# Patient Record
Sex: FEMALE | Race: WHITE | Employment: PART TIME | URBAN - METROPOLITAN AREA
[De-identification: names, ages, dates, MRNs, and addresses within clinical notes are randomized per-mention and may not be internally consistent; named-entity substitution may affect disease eponyms.]

---

## 2017-03-31 DIAGNOSIS — L70.0 ACNE VULGARIS: ICD-10-CM

## 2017-04-06 RX ORDER — NORETHINDRONE ACETATE AND ETHINYL ESTRADIOL 1MG-20(21)
KIT ORAL
Qty: 3 PACKET | Refills: 1 | Status: SHIPPED | OUTPATIENT
Start: 2017-04-06 | End: 2017-04-30 | Stop reason: SDUPTHER

## 2017-04-30 RX ORDER — NORETHINDRONE ACETATE AND ETHINYL ESTRADIOL 1MG-20(21)
KIT ORAL
Qty: 3 PACKET | Refills: 1 | Status: SHIPPED | OUTPATIENT
Start: 2017-04-30 | End: 2017-11-28 | Stop reason: SDUPTHER

## 2017-05-08 ENCOUNTER — OFFICE VISIT (OUTPATIENT)
Dept: FAMILY MEDICINE CLINIC | Age: 42
End: 2017-05-08

## 2017-05-08 VITALS
DIASTOLIC BLOOD PRESSURE: 74 MMHG | OXYGEN SATURATION: 97 % | SYSTOLIC BLOOD PRESSURE: 102 MMHG | HEIGHT: 60 IN | BODY MASS INDEX: 32.67 KG/M2 | HEART RATE: 82 BPM | WEIGHT: 166.4 LBS

## 2017-05-08 DIAGNOSIS — S86.811A STRAIN OF CALF MUSCLE, RIGHT, INITIAL ENCOUNTER: Primary | ICD-10-CM

## 2017-05-08 PROBLEM — S86.819A STRAIN OF CALF MUSCLE: Status: ACTIVE | Noted: 2017-05-08

## 2017-05-08 PROCEDURE — 99213 OFFICE O/P EST LOW 20 MIN: CPT | Performed by: FAMILY MEDICINE

## 2017-05-08 RX ORDER — PREDNISONE 10 MG/1
10 TABLET ORAL 2 TIMES DAILY
Qty: 10 TABLET | Refills: 0 | Status: SHIPPED | OUTPATIENT
Start: 2017-05-08 | End: 2017-05-13

## 2017-05-08 RX ORDER — IBUPROFEN 200 MG
200 TABLET ORAL EVERY 6 HOURS PRN
COMMUNITY

## 2017-05-08 ASSESSMENT — ENCOUNTER SYMPTOMS
SORE THROAT: 0
EYE DISCHARGE: 0
DIARRHEA: 0
COUGH: 0
VOMITING: 0
BLOOD IN STOOL: 0
NAUSEA: 0
BACK PAIN: 0
SINUS PRESSURE: 0
SHORTNESS OF BREATH: 0

## 2017-05-10 ENCOUNTER — TELEPHONE (OUTPATIENT)
Dept: FAMILY MEDICINE CLINIC | Age: 42
End: 2017-05-10

## 2017-05-30 ENCOUNTER — OFFICE VISIT (OUTPATIENT)
Dept: FAMILY MEDICINE CLINIC | Age: 42
End: 2017-05-30

## 2017-05-30 VITALS
OXYGEN SATURATION: 98 % | BODY MASS INDEX: 32.34 KG/M2 | WEIGHT: 167 LBS | SYSTOLIC BLOOD PRESSURE: 126 MMHG | HEART RATE: 80 BPM | DIASTOLIC BLOOD PRESSURE: 80 MMHG

## 2017-05-30 DIAGNOSIS — F33.0 MAJOR DEPRESSIVE DISORDER, RECURRENT EPISODE, MILD (HCC): ICD-10-CM

## 2017-05-30 DIAGNOSIS — L70.0 ACNE VULGARIS: Primary | ICD-10-CM

## 2017-05-30 PROBLEM — R62.50 DEVELOPMENTAL DELAY: Status: ACTIVE | Noted: 2017-05-30

## 2017-05-30 PROCEDURE — 99213 OFFICE O/P EST LOW 20 MIN: CPT | Performed by: FAMILY MEDICINE

## 2017-05-30 ASSESSMENT — PATIENT HEALTH QUESTIONNAIRE - PHQ9
1. LITTLE INTEREST OR PLEASURE IN DOING THINGS: 0
SUM OF ALL RESPONSES TO PHQ QUESTIONS 1-9: 0
SUM OF ALL RESPONSES TO PHQ9 QUESTIONS 1 & 2: 0
2. FEELING DOWN, DEPRESSED OR HOPELESS: 0

## 2017-08-28 DIAGNOSIS — F33.0 MAJOR DEPRESSIVE DISORDER, RECURRENT EPISODE, MILD (HCC): ICD-10-CM

## 2017-08-28 RX ORDER — FLUOXETINE HYDROCHLORIDE 20 MG/1
20 CAPSULE ORAL DAILY
Qty: 90 CAPSULE | Refills: 1 | Status: SHIPPED | OUTPATIENT
Start: 2017-08-28 | End: 2017-11-28 | Stop reason: SDUPTHER

## 2017-11-28 ENCOUNTER — OFFICE VISIT (OUTPATIENT)
Dept: FAMILY MEDICINE CLINIC | Age: 42
End: 2017-11-28

## 2017-11-28 VITALS
SYSTOLIC BLOOD PRESSURE: 124 MMHG | WEIGHT: 166 LBS | HEART RATE: 67 BPM | OXYGEN SATURATION: 98 % | HEIGHT: 60 IN | DIASTOLIC BLOOD PRESSURE: 76 MMHG | BODY MASS INDEX: 32.59 KG/M2

## 2017-11-28 DIAGNOSIS — E78.2 MIXED HYPERLIPIDEMIA: ICD-10-CM

## 2017-11-28 DIAGNOSIS — L70.0 ACNE VULGARIS: ICD-10-CM

## 2017-11-28 DIAGNOSIS — J30.2 PERENNIAL ALLERGIC RHINITIS WITH SEASONAL VARIATION: ICD-10-CM

## 2017-11-28 DIAGNOSIS — J30.89 PERENNIAL ALLERGIC RHINITIS WITH SEASONAL VARIATION: ICD-10-CM

## 2017-11-28 DIAGNOSIS — Z23 NEED FOR INFLUENZA VACCINATION: ICD-10-CM

## 2017-11-28 DIAGNOSIS — F33.0 MAJOR DEPRESSIVE DISORDER, RECURRENT EPISODE, MILD (HCC): Primary | ICD-10-CM

## 2017-11-28 PROCEDURE — G8427 DOCREV CUR MEDS BY ELIG CLIN: HCPCS | Performed by: FAMILY MEDICINE

## 2017-11-28 PROCEDURE — 1036F TOBACCO NON-USER: CPT | Performed by: FAMILY MEDICINE

## 2017-11-28 PROCEDURE — G8484 FLU IMMUNIZE NO ADMIN: HCPCS | Performed by: FAMILY MEDICINE

## 2017-11-28 PROCEDURE — 99214 OFFICE O/P EST MOD 30 MIN: CPT | Performed by: FAMILY MEDICINE

## 2017-11-28 PROCEDURE — 90686 IIV4 VACC NO PRSV 0.5 ML IM: CPT | Performed by: FAMILY MEDICINE

## 2017-11-28 PROCEDURE — G8417 CALC BMI ABV UP PARAM F/U: HCPCS | Performed by: FAMILY MEDICINE

## 2017-11-28 PROCEDURE — G0008 ADMIN INFLUENZA VIRUS VAC: HCPCS | Performed by: FAMILY MEDICINE

## 2017-11-28 RX ORDER — NORETHINDRONE ACETATE AND ETHINYL ESTRADIOL 1MG-20(21)
KIT ORAL
Qty: 3 PACKET | Refills: 3 | Status: SHIPPED | OUTPATIENT
Start: 2017-11-28 | End: 2020-02-11 | Stop reason: SDUPTHER

## 2017-11-28 RX ORDER — CETIRIZINE HYDROCHLORIDE 10 MG/1
10 TABLET ORAL NIGHTLY
Qty: 90 TABLET | Refills: 3 | Status: SHIPPED | OUTPATIENT
Start: 2017-11-28

## 2017-11-28 RX ORDER — FLUOXETINE HYDROCHLORIDE 40 MG/1
40 CAPSULE ORAL DAILY
Qty: 90 CAPSULE | Refills: 3 | Status: SHIPPED | OUTPATIENT
Start: 2017-11-28 | End: 2017-11-30 | Stop reason: SDUPTHER

## 2017-11-28 RX ORDER — DOXYCYCLINE HYCLATE 100 MG
TABLET ORAL
Qty: 90 TABLET | Refills: 3 | Status: CANCELLED | OUTPATIENT
Start: 2017-11-28

## 2017-11-28 RX ORDER — FLUTICASONE PROPIONATE 50 MCG
2 SPRAY, SUSPENSION (ML) NASAL DAILY PRN
Qty: 1 BOTTLE | Refills: 3 | Status: CANCELLED | OUTPATIENT
Start: 2017-11-28

## 2017-11-28 NOTE — PROGRESS NOTES
Chief Complaint   Patient presents with    6 Month Follow-Up    Discuss Medications     prozac     Moods have become more irritable per patient since decrease prozac 20mg. Acne medication stable on OCP and doxycycline without side effects. Patient denies any exertional chest pain, dyspnea, palpitations, syncope, orthopnea, edema or paroxysmal nocturnal dyspnea. Denies any fever, chills, unintentional weight loss or night sweats. Denies any suicidial or homicidal ideation or hallucinations. No change in vision. ROS: Pertinent items are noted in HPI. reports that she has never smoked. She has never used smokeless tobacco.   reports that she does not drink alcohol. Physical Exam     Nursing note reviewed  /76 (Site: Right Arm, Position: Sitting, Cuff Size: Medium Adult)   Pulse 67   Ht 5' 0.25\" (1.53 m)   Wt 166 lb (75.3 kg)   LMP  (LMP Unknown)   SpO2 98%   BMI 32.15 kg/m²   BP Readings from Last 3 Encounters:   11/28/17 124/76   05/30/17 126/80   05/08/17 102/74     Wt Readings from Last 3 Encounters:   11/28/17 166 lb (75.3 kg)   05/30/17 167 lb (75.8 kg)   05/08/17 166 lb 6.4 oz (75.5 kg)     Body mass index is 32.15 kg/m². Constitutional: She is oriented to person, place, and time and here  Improved moods and asking lots of questions during interview. Very interactive. Few scattered papules on chin. Here with Audrey Walker her caregiver  HENT: Mouth/Throat: Oropharynx is clear and moist.    Eyes: Conjunctivae are normal. Pupils are equal, round, and reactive to light. Neck: Neck supple. Cardiovascular: Normal rate, regular rhythm and normal heart sounds. no murmur   Pulmonary/Chest: Effort normal and breath sounds normal.  no wheezing, ronchi,   Neurological: She is alert and oriented to person, place, and time. Walks stammering gait and using seated wheeled walked today. Shelvy Setting Resting head tremor.    MSK: no LE edema   Psych: appears anxious when discussing some social issues in her

## 2017-11-28 NOTE — PROGRESS NOTES
Vaccine Information Sheet, \"Influenza - Inactivated\"  given to Fabricio Briones, or parent/legal guardian of  Fabricio Briones and verbalized understanding. Patient responses:    Have you ever had a reaction to a flu vaccine? No  Are you able to eat eggs without adverse effects? Yes  Do you have any current illness? No  Have you ever had Guillian Houston Syndrome? No    Flu vaccine given per order. Please see immunization tab.

## 2017-11-28 NOTE — ASSESSMENT & PLAN NOTE
Patient moods have gotten more irritable and less motivation since decrease in medication. Will trial increase to prozac 40mg to take nightly.

## 2018-06-12 DIAGNOSIS — F33.0 MAJOR DEPRESSIVE DISORDER, RECURRENT EPISODE, MILD (HCC): ICD-10-CM

## 2018-06-12 RX ORDER — FLUOXETINE HYDROCHLORIDE 20 MG/1
20 CAPSULE ORAL DAILY
Qty: 90 CAPSULE | Refills: 1 | Status: SHIPPED | OUTPATIENT
Start: 2018-06-12 | End: 2018-11-13

## 2018-06-25 DIAGNOSIS — L70.0 ACNE VULGARIS: ICD-10-CM

## 2018-06-25 RX ORDER — NORETHINDRONE ACETATE AND ETHINYL ESTRADIOL 1MG-20(21)
KIT ORAL
Qty: 90 TABLET | Refills: 1 | Status: SHIPPED | OUTPATIENT
Start: 2018-06-25 | End: 2018-12-13 | Stop reason: SDUPTHER

## 2018-11-13 DIAGNOSIS — L70.0 ACNE VULGARIS: ICD-10-CM

## 2018-11-13 DIAGNOSIS — F33.0 MAJOR DEPRESSIVE DISORDER, RECURRENT EPISODE, MILD (HCC): ICD-10-CM

## 2018-11-13 RX ORDER — DOXYCYCLINE HYCLATE 100 MG
TABLET ORAL
Qty: 90 TABLET | Refills: 3 | Status: SHIPPED | OUTPATIENT
Start: 2018-11-13 | End: 2019-01-29 | Stop reason: SDUPTHER

## 2018-11-13 RX ORDER — FLUOXETINE HYDROCHLORIDE 40 MG/1
CAPSULE ORAL
Qty: 90 CAPSULE | Refills: 3 | Status: SHIPPED | OUTPATIENT
Start: 2018-11-13 | End: 2019-01-29 | Stop reason: SDUPTHER

## 2018-12-13 DIAGNOSIS — L70.0 ACNE VULGARIS: ICD-10-CM

## 2018-12-13 RX ORDER — NORETHINDRONE ACETATE AND ETHINYL ESTRADIOL 1MG-20(21)
KIT ORAL
Qty: 90 TABLET | Refills: 3 | Status: SHIPPED | OUTPATIENT
Start: 2018-12-13 | End: 2019-01-29 | Stop reason: SDUPTHER

## 2019-01-29 ENCOUNTER — OFFICE VISIT (OUTPATIENT)
Dept: FAMILY MEDICINE CLINIC | Age: 44
End: 2019-01-29
Payer: MEDICARE

## 2019-01-29 VITALS
WEIGHT: 168.2 LBS | SYSTOLIC BLOOD PRESSURE: 128 MMHG | BODY MASS INDEX: 33.02 KG/M2 | OXYGEN SATURATION: 97 % | RESPIRATION RATE: 16 BRPM | HEART RATE: 86 BPM | DIASTOLIC BLOOD PRESSURE: 80 MMHG | HEIGHT: 60 IN

## 2019-01-29 DIAGNOSIS — R26.81 UNSTEADY GAIT: ICD-10-CM

## 2019-01-29 DIAGNOSIS — L70.0 ACNE VULGARIS: ICD-10-CM

## 2019-01-29 DIAGNOSIS — F33.0 MAJOR DEPRESSIVE DISORDER, RECURRENT EPISODE, MILD (HCC): Primary | ICD-10-CM

## 2019-01-29 DIAGNOSIS — R62.50 DEVELOPMENTAL DELAY: ICD-10-CM

## 2019-01-29 PROCEDURE — 99214 OFFICE O/P EST MOD 30 MIN: CPT | Performed by: FAMILY MEDICINE

## 2019-01-29 PROCEDURE — G8417 CALC BMI ABV UP PARAM F/U: HCPCS | Performed by: FAMILY MEDICINE

## 2019-01-29 PROCEDURE — G8427 DOCREV CUR MEDS BY ELIG CLIN: HCPCS | Performed by: FAMILY MEDICINE

## 2019-01-29 PROCEDURE — 1036F TOBACCO NON-USER: CPT | Performed by: FAMILY MEDICINE

## 2019-01-29 PROCEDURE — G8484 FLU IMMUNIZE NO ADMIN: HCPCS | Performed by: FAMILY MEDICINE

## 2019-01-29 RX ORDER — FLUOXETINE HYDROCHLORIDE 40 MG/1
CAPSULE ORAL
Qty: 90 CAPSULE | Refills: 3 | Status: SHIPPED | OUTPATIENT
Start: 2019-01-29 | End: 2019-12-14 | Stop reason: SDUPTHER

## 2019-01-29 RX ORDER — NORETHINDRONE ACETATE AND ETHINYL ESTRADIOL 1MG-20(21)
KIT ORAL
Qty: 90 TABLET | Refills: 3 | Status: SHIPPED | OUTPATIENT
Start: 2019-01-29 | End: 2020-01-13 | Stop reason: SDUPTHER

## 2019-01-29 RX ORDER — DOXYCYCLINE HYCLATE 100 MG
TABLET ORAL
Qty: 90 TABLET | Refills: 3 | Status: SHIPPED | OUTPATIENT
Start: 2019-01-29 | End: 2019-12-20

## 2019-01-30 PROBLEM — S86.819A STRAIN OF CALF MUSCLE: Status: RESOLVED | Noted: 2017-05-08 | Resolved: 2019-01-30

## 2019-02-27 ENCOUNTER — TELEPHONE (OUTPATIENT)
Dept: FAMILY MEDICINE CLINIC | Age: 44
End: 2019-02-27

## 2019-05-13 ENCOUNTER — TELEPHONE (OUTPATIENT)
Dept: FAMILY MEDICINE CLINIC | Age: 44
End: 2019-05-13

## 2019-05-13 NOTE — LETTER
May 14, 2019     Patient: Nicolasa Shoemaker   YOB: 1975   Date of Visit: 5/13/2019       To Whom It May Concern: It is my medical opinion that Violet Gomes requires a disability parking placard for the following reasons:  She has limited walking ability due to a neurologic condition. Duration of need: 5 years    If you have any questions or concerns, please don't hesitate to call. Sincerely,          Jennifer Aguilera M.D., M.P.H  [address]  [facility] [facility] [address]  [facility], [address] [facility]  Phone: [phone]  Fax: [phone]

## 2019-05-13 NOTE — TELEPHONE ENCOUNTER
Pt mother came to the office and is asking for 2 handicap placard letters to be written for pt, encounter also sent for pt sister.   Please advise

## 2019-07-02 ENCOUNTER — OFFICE VISIT (OUTPATIENT)
Dept: FAMILY MEDICINE CLINIC | Age: 44
End: 2019-07-02
Payer: MEDICARE

## 2019-07-02 VITALS
OXYGEN SATURATION: 98 % | WEIGHT: 165 LBS | BODY MASS INDEX: 32.39 KG/M2 | HEART RATE: 106 BPM | DIASTOLIC BLOOD PRESSURE: 78 MMHG | HEIGHT: 60 IN | SYSTOLIC BLOOD PRESSURE: 128 MMHG

## 2019-07-02 DIAGNOSIS — R45.86 EMOTIONAL LABILITY: ICD-10-CM

## 2019-07-02 DIAGNOSIS — F33.0 MAJOR DEPRESSIVE DISORDER, RECURRENT EPISODE, MILD (HCC): Primary | ICD-10-CM

## 2019-07-02 PROCEDURE — 1036F TOBACCO NON-USER: CPT | Performed by: FAMILY MEDICINE

## 2019-07-02 PROCEDURE — 99213 OFFICE O/P EST LOW 20 MIN: CPT | Performed by: FAMILY MEDICINE

## 2019-07-02 PROCEDURE — G8427 DOCREV CUR MEDS BY ELIG CLIN: HCPCS | Performed by: FAMILY MEDICINE

## 2019-07-02 PROCEDURE — G8417 CALC BMI ABV UP PARAM F/U: HCPCS | Performed by: FAMILY MEDICINE

## 2019-07-08 ENCOUNTER — TELEPHONE (OUTPATIENT)
Dept: FAMILY MEDICINE CLINIC | Age: 44
End: 2019-07-08

## 2019-07-08 DIAGNOSIS — F33.0 MAJOR DEPRESSIVE DISORDER, RECURRENT EPISODE, MILD (HCC): Primary | ICD-10-CM

## 2019-07-11 ENCOUNTER — TELEPHONE (OUTPATIENT)
Dept: FAMILY MEDICINE CLINIC | Age: 44
End: 2019-07-11

## 2019-07-12 NOTE — TELEPHONE ENCOUNTER
Staff to call patient regarding I put a external  referral for counseling so she can start with the counselor she set up with from [facility]. Patient should call the office, rather than my personal phone if any further questions. Staff to schedule in person extended office visit with me in 8 weeks to follow up with her moods. Enid Lemus

## 2019-12-14 DIAGNOSIS — F33.0 MAJOR DEPRESSIVE DISORDER, RECURRENT EPISODE, MILD (HCC): ICD-10-CM

## 2019-12-16 RX ORDER — FLUOXETINE HYDROCHLORIDE 40 MG/1
CAPSULE ORAL
Qty: 90 CAPSULE | Refills: 3 | Status: SHIPPED | OUTPATIENT
Start: 2019-12-16 | End: 2020-08-18 | Stop reason: SDUPTHER

## 2019-12-18 DIAGNOSIS — L70.0 ACNE VULGARIS: ICD-10-CM

## 2019-12-20 RX ORDER — DOXYCYCLINE HYCLATE 100 MG
TABLET ORAL
Qty: 90 TABLET | Refills: 3 | Status: SHIPPED | OUTPATIENT
Start: 2019-12-20 | End: 2020-08-18 | Stop reason: SDUPTHER

## 2020-01-13 RX ORDER — NORETHINDRONE ACETATE AND ETHINYL ESTRADIOL 1MG-20(21)
KIT ORAL
Qty: 90 TABLET | Refills: 3 | Status: SHIPPED | OUTPATIENT
Start: 2020-01-13 | End: 2021-01-19

## 2020-02-10 PROBLEM — Z99.89 USES WALKER: Status: ACTIVE | Noted: 2020-02-10

## 2020-02-10 NOTE — PROGRESS NOTES
Chief Complaint   Patient presents with    1 Year Follow Up    Medication Refill   Working at [employer] M-F 9-2 and enjoying it. Prozac has been working well. No other complaints. No results found for: LABA1C  Lab Results   Component Value Date    CREATININE 0.7 06/13/2012     Lab Results   Component Value Date    ALT 23 06/13/2012    AST 27 06/13/2012     Lab Results   Component Value Date    CHOL 204 (H) 06/13/2012    TRIG 113 06/13/2012    HDL 55 (L) 06/13/2012    LDLDIRECT 144 (H) 06/13/2012            ROS: Pertinent items are noted in HPI. reports that she has never smoked. She has never used smokeless tobacco.   reports no history of alcohol use. Physical Exam     Nursing note reviewed  /74 (Site: Right Upper Arm, Position: Sitting, Cuff Size: Medium Adult)   Pulse 67   Resp 14   Ht 5' 0.25\" (1.53 m)   Wt 162 lb 6.4 oz (73.7 kg)   SpO2 98%   BMI 31.45 kg/m²   BP Readings from Last 3 Encounters:   02/11/20 128/74   07/02/19 128/78   01/29/19 128/80     Wt Readings from Last 3 Encounters:   02/11/20 162 lb 6.4 oz (73.7 kg)   07/02/19 165 lb (74.8 kg)   01/29/19 168 lb 3.2 oz (76.3 kg)     Body mass index is 31.45 kg/m². Constitutional: She is oriented to person, place, and time and here  Improved moods and asking lots of questions during interview. Very interactive. HENT: Mouth/Throat: Oropharynx is clear and moist.    Eyes: Conjunctivae are normal. Pupils are equal, round, and reactive to light. wears glasses mild horizontal nystagmus  Neck: Neck supple. Cardiovascular: Normal rate, regular rhythm and normal heart sounds. no murmur   Pulmonary/Chest: Effort normal and breath sounds normal.  no wheezing, ronchi,   Neurological: She is alert and oriented to person, place, and time. Walks stammering gait and using seated wheeled walked today. Canda Nay Resting head tremor. Psychiatric:  In good spirits today.    MSK: no LE edema       Diagnosis Assessment and Associated Orders:

## 2020-02-11 ENCOUNTER — OFFICE VISIT (OUTPATIENT)
Dept: FAMILY MEDICINE CLINIC | Age: 45
End: 2020-02-11
Payer: MEDICARE

## 2020-02-11 VITALS
HEART RATE: 67 BPM | SYSTOLIC BLOOD PRESSURE: 128 MMHG | RESPIRATION RATE: 14 BRPM | OXYGEN SATURATION: 98 % | HEIGHT: 60 IN | BODY MASS INDEX: 31.88 KG/M2 | DIASTOLIC BLOOD PRESSURE: 74 MMHG | WEIGHT: 162.4 LBS

## 2020-02-11 PROCEDURE — G8427 DOCREV CUR MEDS BY ELIG CLIN: HCPCS | Performed by: FAMILY MEDICINE

## 2020-02-11 PROCEDURE — 99213 OFFICE O/P EST LOW 20 MIN: CPT | Performed by: FAMILY MEDICINE

## 2020-02-11 PROCEDURE — G8484 FLU IMMUNIZE NO ADMIN: HCPCS | Performed by: FAMILY MEDICINE

## 2020-02-11 PROCEDURE — G8417 CALC BMI ABV UP PARAM F/U: HCPCS | Performed by: FAMILY MEDICINE

## 2020-02-11 PROCEDURE — G0444 DEPRESSION SCREEN ANNUAL: HCPCS | Performed by: FAMILY MEDICINE

## 2020-02-11 PROCEDURE — 1036F TOBACCO NON-USER: CPT | Performed by: FAMILY MEDICINE

## 2020-02-11 ASSESSMENT — PATIENT HEALTH QUESTIONNAIRE - PHQ9
SUM OF ALL RESPONSES TO PHQ QUESTIONS 1-9: 4
10. IF YOU CHECKED OFF ANY PROBLEMS, HOW DIFFICULT HAVE THESE PROBLEMS MADE IT FOR YOU TO DO YOUR WORK, TAKE CARE OF THINGS AT HOME, OR GET ALONG WITH OTHER PEOPLE: 1
9. THOUGHTS THAT YOU WOULD BE BETTER OFF DEAD, OR OF HURTING YOURSELF: 0
5. POOR APPETITE OR OVEREATING: 0
SUM OF ALL RESPONSES TO PHQ QUESTIONS 1-9: 4
3. TROUBLE FALLING OR STAYING ASLEEP: 0
1. LITTLE INTEREST OR PLEASURE IN DOING THINGS: 1
2. FEELING DOWN, DEPRESSED OR HOPELESS: 2
8. MOVING OR SPEAKING SO SLOWLY THAT OTHER PEOPLE COULD HAVE NOTICED. OR THE OPPOSITE, BEING SO FIGETY OR RESTLESS THAT YOU HAVE BEEN MOVING AROUND A LOT MORE THAN USUAL: 0
4. FEELING TIRED OR HAVING LITTLE ENERGY: 1
6. FEELING BAD ABOUT YOURSELF - OR THAT YOU ARE A FAILURE OR HAVE LET YOURSELF OR YOUR FAMILY DOWN: 0
SUM OF ALL RESPONSES TO PHQ9 QUESTIONS 1 & 2: 3
7. TROUBLE CONCENTRATING ON THINGS, SUCH AS READING THE NEWSPAPER OR WATCHING TELEVISION: 0

## 2020-02-18 ENCOUNTER — HOSPITAL ENCOUNTER (OUTPATIENT)
Dept: WOMENS IMAGING | Age: 45
Discharge: HOME | End: 2020-02-18
Payer: MEDICARE

## 2020-02-18 PROCEDURE — 77067 SCR MAMMO BI INCL CAD: CPT

## 2020-02-21 ENCOUNTER — TELEPHONE (OUTPATIENT)
Dept: FAMILY MEDICINE CLINIC | Age: 45
End: 2020-02-21

## 2020-02-21 ENCOUNTER — HOSPITAL ENCOUNTER (OUTPATIENT)
Dept: ULTRASOUND IMAGING | Age: 45
Discharge: HOME | End: 2020-02-21
Payer: MEDICARE

## 2020-02-21 PROCEDURE — 76642 ULTRASOUND BREAST LIMITED: CPT

## 2020-03-21 PROBLEM — N60.02 BREAST CYST, LEFT: Status: ACTIVE | Noted: 2020-03-21

## 2020-08-17 ENCOUNTER — TELEPHONE (OUTPATIENT)
Dept: FAMILY MEDICINE CLINIC | Age: 45
End: 2020-08-17

## 2020-08-17 NOTE — TELEPHONE ENCOUNTER
Noted. Unclear what labs Tyesha Dose had in mind. Currently the only person listed on patient's HIPPA is her mother Ainsley Acuna (although historically, Tyesha Dose has been a caregiver for patient). Will encourage patient and va to bring up these issues at her upcoming appointment.

## 2020-08-17 NOTE — TELEPHONE ENCOUNTER
Patsey Morton called and stated that pt is not taking meds like she should. Pt is very agitated, and mad. Pt will tell PCP that she is doing fine. Liudmila Carrasquillo is asking for labs to be done. And may speak to pt about medications. Liudmila Carrasquillo will be with patient at [facility] but knows that if she says something in front of patient then she will get very mad.

## 2020-08-18 ENCOUNTER — VIRTUAL VISIT (OUTPATIENT)
Dept: FAMILY MEDICINE CLINIC | Age: 45
End: 2020-08-18
Payer: MEDICARE

## 2020-08-18 PROCEDURE — G0438 PPPS, INITIAL VISIT: HCPCS | Performed by: FAMILY MEDICINE

## 2020-08-18 PROCEDURE — G8431 POS CLIN DEPRES SCRN F/U DOC: HCPCS | Performed by: FAMILY MEDICINE

## 2020-08-18 RX ORDER — FLUOXETINE HYDROCHLORIDE 40 MG/1
CAPSULE ORAL
Qty: 90 CAPSULE | Refills: 3 | Status: SHIPPED | OUTPATIENT
Start: 2020-08-18 | End: 2021-09-02 | Stop reason: SDUPTHER

## 2020-08-18 RX ORDER — DOXYCYCLINE HYCLATE 100 MG
TABLET ORAL
Qty: 90 TABLET | Refills: 3 | Status: SHIPPED | OUTPATIENT
Start: 2020-08-18 | End: 2021-09-06 | Stop reason: SDUPTHER

## 2020-08-18 ASSESSMENT — PATIENT HEALTH QUESTIONNAIRE - PHQ9: SUM OF ALL RESPONSES TO PHQ QUESTIONS 1-9: 16

## 2020-08-18 ASSESSMENT — LIFESTYLE VARIABLES: HOW OFTEN DO YOU HAVE A DRINK CONTAINING ALCOHOL: 0

## 2020-08-18 NOTE — PATIENT INSTRUCTIONS
Personalized Preventive Plan for Renata Cooks - 8/18/2020  Medicare offers a range of preventive health benefits. Some of the tests and screenings are paid in full while other may be subject to a deductible, co-insurance, and/or copay. Some of these benefits include a comprehensive review of your medical history including lifestyle, illnesses that may run in your family, and various assessments and screenings as appropriate. After reviewing your medical record and screening and assessments performed today your provider may have ordered immunizations, labs, imaging, and/or referrals for you. A list of these orders (if applicable) as well as your Preventive Care list are included within your After Visit Summary for your review. Other Preventive Recommendations:    · A preventive eye exam performed by an eye specialist is recommended every 1-2 years to screen for glaucoma; cataracts, macular degeneration, and other eye disorders. · A preventive dental visit is recommended every 6 months. · Try to get at least 150 minutes of exercise per week or 10,000 steps per day on a pedometer . · Order or download the FREE \"Exercise & Physical Activity: Your Everyday Guide\" from [employer]. Call 0-199.611.4609 or search [employer] online. · You need 5069-2067 mg of calcium and 4926-4691 IU of vitamin D per day. It is possible to meet your calcium requirement with diet alone, but a vitamin D supplement is usually necessary to meet this goal.  · When exposed to the sun, use a sunscreen that protects against both UVA and UVB radiation with an SPF of 30 or greater. Reapply every 2 to 3 hours or after sweating, drying off with a towel, or swimming. · Always wear a seat belt when traveling in a car. Always wear a helmet when riding a bicycle or motorcycle.

## 2020-08-18 NOTE — PROGRESS NOTES
Medicare Annual Wellness Visit  Are Name: Kia Stevesn Date: 2020   MRN: [id number] Sex: Female   Age: 39 y.o. Ethnicity: Non-/Non    : 1975 Race: White      Bronwyn Cunningham is here for Medicare AWV and Covid Testing (Testing for came out Negative)    Screenings for behavioral, psychosocial and functional/safety risks, and cognitive dysfunction are all negative except as indicated below. These results, as well as other patient data from the [address] form, are documented in Flowsheets linked to this Encounter. Richy Wray present with patient, who is her caregiver. Patient reports still attending work in person at [employer],  Patient gets nervous with other co-workers around during lunch time. Patient is stress with corona virus but taking prozac when she remembers to take the medication but hasn't been consistent skipping days at at [facility]. Parents are moving 5 minutes away from here. Dating someone new, Martha Richardson,  who work at [employer] and getting a new cell phone. Allergies   Allergen Reactions    Molds & Smuts      sneezing         Prior to Visit Medications    Medication Sig Taking? Authorizing Provider   doxycycline hyclate (VIBRA-TABS) 100 MG tablet TAKE 1 TABLET BY MOUTH EVERY DAY Yes Briana Castle MD   FLUoxetine (PROZAC) 40 MG capsule TAKE 1 CAPSULE BY MOUTH Isabel Lundberg Yes Briana Castle MD   norethindrone-ethinyl estradiol (JUNEL FE 1/20) 1-20 MG-MCG per tablet TAKE 1 TABLET BY MOUTH DAILY.  Yes Briana Castle MD   cetirizine (ZYRTEC) 10 MG tablet Take 1 tablet by mouth nightly Yes Briana Castle MD   ibuprofen (ADVIL;MOTRIN) 200 MG tablet Take 200 mg by mouth every 6 hours as needed for Pain Yes Historical Provider, MD   fluticasone (FLONASE) 50 MCG/ACT nasal spray 2 sprays by Nasal route daily as needed for Rhinitis Yes Briana Castle MD   ipratropium (ATROVENT) 0.06 % nasal spray  Yes Historical Provider, MD   Pediatric Multivitamins-Fl (MULTI VIT/FL PO) Take  by mouth. Yes Historical Provider, MD   vitamin E 400 UNIT capsule Take 400 Units by mouth daily. Yes Historical Provider, MD   calcium carbonate (OSCAL) 500 MG TABS tablet Take 500 mg by mouth daily. Yes Historical Provider, MD   naproxen (NAPROSYN) 500 MG tablet Take 1 tablet by mouth 2 times daily for 20 doses. 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 Henny Arnold Developmental delay     Environmental allergies     Finger pain, left- 5th digit MCP 1/20/2014    Fracture of lumbar vertebra ([address] 2006    Pedicle fracture L4    Headache(784.0)     neuro-opthamaology [facility] Dr. Lina Pickard (hyperlipidemia)     diet controlling    Knee pain 11/16/2012    Left knee pain 10/2/2012    Unsteady gait     h/o cerebellar hypoplasia    Uses walker 2/10/2020    Weight gain        Past Surgical History:   Procedure Laterality Date    EYE SURGERY      FRACTURE SURGERY  2006    back, ( back brace ) L4 pedicle at [employer] working out       History reviewed. No pertinent family history. CareTeam (Including outside providers/suppliers regularly involved in providing care):   Patient Care Team:  Kelsey Milligan MD as PCP - Jaren Ansari MD as PCP - [facility] Empaneled Provider    Wt Readings from Last 3 Encounters:   02/11/20 162 lb 6.4 oz (73.7 kg)   07/02/19 165 lb (74.8 kg)   01/29/19 168 lb 3.2 oz (76.3 kg)      No flowsheet data found. There is no height or weight on file to calculate BMI. Based upon direct observation of the patient, evaluation of cognition reveals recent and remote memory intact. Patient's complete Health Risk Assessment and screening values have been reviewed and are found in Flowsheets.  The following problems were reviewed today and where indicated follow up appointments were made and/or referrals ordered. Positive Risk Factor Screenings with Interventions:     Fall Risk:  2 or more falls in past year?: (!) yes  Fall with injury in past year?: (!) yes  Fall Risk Interventions:    · Patient declines any further evaluation/treatment for this issue    Health Habits/Nutrition:  Health Habits/Nutrition  Do you exercise for at least 20 minutes 2-3 times per week?: Yes  Have you lost any weight without trying in the past 3 months?: No  Do you eat fewer than 2 meals per day?: No  Have you seen a dentist within the past year?: Yes  There is no height or weight on file to calculate BMI. Health Habits/Nutrition Interventions:  · none    Personalized Preventive Plan   Current Health Maintenance Status  Immunization History   Administered Date(s) Administered    Influenza Virus Vaccine 10/08/2012, 11/01/2013, 10/07/2019    Influenza Whole 10/10/2015    Influenza, Quadv, IM, PF (6 mo and older Fluzone, Flulaval, Fluarix, and 3 yrs and older Afluria) 11/28/2017    Tdap (Boostrix, Adacel) 06/27/2014        Health Maintenance   Topic Date Due    HIV screen  03/22/1990    Cervical cancer screen  03/22/1996    Diabetes screen  03/22/2015    Lipid screen  06/13/2017    Annual Wellness Visit (AWV)  05/29/2019    Flu vaccine (1) 09/01/2020    DTaP/Tdap/Td vaccine (2 - Td) 06/27/2024    Hepatitis A vaccine  Aged Out    Hepatitis B vaccine  Aged Out    Hib vaccine  Aged Out    Meningococcal (ACWY) vaccine  Aged Out    Pneumococcal 0-64 years Vaccine  Aged Out     Recommendations for [employer] Due: see orders and patient instructions/AVS.  . Recommended screening schedule for the next 5-10 years is provided to the patient in written form: see Patient Instructions/AVS.    Bronwyn was seen today for medicare awv and covid testing.     Diagnoses and all orders for this visit:    Routine general medical examination at a [facility] care facility    Acne vulgaris  -     doxycycline hyclate (VIBRA-TABS) 100 MG tablet; TAKE 1 TABLET BY MOUTH EVERY DAY    Major depressive disorder, recurrent episode, mild ([address]  Patient reports she will be more consistent with taking her medication to help her moods and would like to stay on prozac for now. -     FLUoxetine (PROZAC) 40 MG capsule; TAKE 1 CAPSULE BY MOUTH EVERY DAY  -     CBC; Future  -     Basic Metabolic Panel; Future    Cyst of left breast  -     US BREAST LEFT COMPLETE; Future    Encounter for lipid screening for cardiovascular disease  -     Lipid, Fasting; Future          1. Staff to help schedule the left breast ultrasound and lab work same day scheduled and patient would like appointment anytime after 3pm on weekdays. 2. Med refills as above. Return in about 6 months (around 2/18/2021) for Med refills. Willian No is a 39 y.o. female being evaluated by a Virtual Visit (phone) encounter to address concerns as mentioned above. A caregiver was present when appropriate. Due to this being a TeleHealth encounter (During [facility]- public health emergency), evaluation of the following organ systems was limited: Vitals/Constitutional/EENT/Resp/CV/GI//MS/Neuro/Skin/Heme-Lymph-Imm. Pursuant to the emergency declaration under the [address] [place] [address], [address] [place] [address] authority and the [employer] Act, this Virtual Visit was conducted with patient's (and/or legal guardian's) consent, to reduce the patient's risk of exposure to COVID-19 and provide necessary medical care. The patient (and/or legal guardian) has also been advised to contact this office for worsening conditions or problems, and seek emergency medical treatment and/or call 911 if deemed necessary. Patient identification was verified at the start of the visit: Yes    Services were provided through phone to substitute for in-person clinic visit. Patient and provider were located at their individual homes. --Klaus Paulson MD on 8/20/2020 at 11:55 AM    An electronic signature was used to authenticate this note. On the basis of positive PHQ-9 screening (PHQ-9 Total Score: 16), the following plan was implemented: patient agrees to take her Prozac more consistently as it had helped her moods in the past.  She declines any other intervention at this time. Patient will follow-up in 6 month(s) with PCP or sooner if needed .

## 2020-08-19 NOTE — PROGRESS NOTES
Called patient and completed AWV questions, sent all US and Labs to central Scheduling with request that patient be scheduled for after 3pm. Sent patient copy of US order along with a note that has next date scheduled for 6 months 2/18/202 at 3:15pm.

## 2020-12-22 ENCOUNTER — HOSPITAL ENCOUNTER (OUTPATIENT)
Dept: ULTRASOUND IMAGING | Age: 45
Discharge: HOME | End: 2020-12-22
Payer: MEDICARE

## 2020-12-22 PROCEDURE — 76642 ULTRASOUND BREAST LIMITED: CPT

## 2021-01-18 DIAGNOSIS — L70.0 ACNE VULGARIS: ICD-10-CM

## 2021-01-19 RX ORDER — NORETHINDRONE ACETATE AND ETHINYL ESTRADIOL 1MG-20(21)
KIT ORAL
Qty: 84 TABLET | Refills: 3 | Status: SHIPPED | OUTPATIENT
Start: 2021-01-19

## 2021-02-23 ENCOUNTER — VIRTUAL VISIT (OUTPATIENT)
Dept: FAMILY MEDICINE CLINIC | Age: 46
End: 2021-02-23
Payer: MEDICARE

## 2021-02-23 DIAGNOSIS — F33.0 MAJOR DEPRESSIVE DISORDER, RECURRENT EPISODE, MILD (HCC): ICD-10-CM

## 2021-02-23 PROCEDURE — 99442 PR PHYS/QHP TELEPHONE EVALUATION 11-20 MIN: CPT | Performed by: FAMILY MEDICINE

## 2021-02-23 NOTE — PROGRESS NOTES
Francisco Price is a 39 y.o. female evaluated via telephone on 2/23/2021. Consent:  She and/or health care decision maker is aware that that she may receive a bill for this telephone service, depending on her insurance coverage, and has provided verbal consent to proceed: Yes    Documentation:    Patient has the following concerns:     Chief Complaint   Patient presents with    Medication Refill   Patient still working at [employer] currently. Patient working on medication compliance and ordered a pill dispenser. Compliant with prozac, OCP that gives her light spotting only, doxycyline. Patient has stress with COVID times and tired of wearing masks but interested in getting vaccine. Moods overall doing well with prozac. Enjoys her new pet cat and has a new boyfriend Osmin, which has been helpful for moods. I communicated with the patient and/or health care decision maker about the following which includes details of this discussion including any medical advice provided:     1. Major depressive disorder, recurrent episode, mild (HCC)  Assessment & Plan:  Stable on Prozac daily. Had a new boyfriend Janett Brandee now who also worked at [employer] in the past, which has been helpful on her moods. Return in about 6 months (around 8/23/2021) for Dual: AWV and Refills- in-person. I affirm this is a Patient Initiated Episode with a Patient who has not had a related appointment within my department in the past 7 days or scheduled within the next 24 hours.     Patient identification was verified at the start of the visit: Yes    Total Time: minutes: 5-10 minutes    Note: not billable if this call serves to triage the patient into an appointment for the relevant concern      [facility]

## 2021-02-23 NOTE — ASSESSMENT & PLAN NOTE
Stable on Prozac daily. Had a new boyfriend Michelle Lambert now who also worked at [employer] in the past, which has been helpful on her moods.

## 2021-06-24 ENCOUNTER — HOSPITAL ENCOUNTER (OUTPATIENT)
Dept: WOMENS IMAGING | Age: 46
Discharge: HOME | End: 2021-06-24
Payer: MEDICARE

## 2021-06-24 DIAGNOSIS — Z12.31 OTHER SCREENING MAMMOGRAM: ICD-10-CM

## 2021-06-24 PROCEDURE — 77067 SCR MAMMO BI INCL CAD: CPT

## 2021-08-30 ENCOUNTER — TELEPHONE (OUTPATIENT)
Dept: FAMILY MEDICINE CLINIC | Age: 46
End: 2021-08-30

## 2021-08-30 DIAGNOSIS — Q04.3 CEREBELLAR HYPOPLASIA (HCC): ICD-10-CM

## 2021-08-30 DIAGNOSIS — R27.0 ATAXIA: Primary | ICD-10-CM

## 2021-08-30 NOTE — TELEPHONE ENCOUNTER
----- Message from Parvin Miller sent at 8/30/2021 10:51 AM EDT -----  Subject: Referral Request    QUESTIONS   Reason for referral request? Pt needs neurology referral to Dr. Karis Brunson at [facility] (T#[phone] QK#[phone]) - referral needs to   only state onset of atexia   Has the physician seen you for this condition before? Yes  Select a date? 2021-08-24  Select the Provider the patient wants to be referred to, if known (PCP or   Specialist)? Outside Physician - Dr. Kamini Recinos   Preferred Specialist (if applicable)? Do you already have an appointment scheduled? No  Additional Information for Provider?   ---------------------------------------------------------------------------  --------------  CALL BACK INFO  What is the best way for the office to contact you? OK to leave message on   voicemail  Preferred Call Back Phone Number?  [phone]

## 2021-09-01 ENCOUNTER — TELEPHONE (OUTPATIENT)
Dept: FAMILY MEDICINE CLINIC | Age: 46
End: 2021-09-01

## 2021-09-02 DIAGNOSIS — F33.0 MAJOR DEPRESSIVE DISORDER, RECURRENT EPISODE, MILD (HCC): ICD-10-CM

## 2021-09-02 DIAGNOSIS — L70.0 ACNE VULGARIS: ICD-10-CM

## 2021-09-02 RX ORDER — FLUOXETINE HYDROCHLORIDE 40 MG/1
CAPSULE ORAL
Qty: 90 CAPSULE | Refills: 3 | Status: SHIPPED | OUTPATIENT
Start: 2021-09-02 | End: 2022-09-09 | Stop reason: SDUPTHER

## 2021-09-06 RX ORDER — DOXYCYCLINE HYCLATE 100 MG
TABLET ORAL
Qty: 90 TABLET | Refills: 3 | Status: SHIPPED | OUTPATIENT
Start: 2021-09-06 | End: 2021-12-03 | Stop reason: SDUPTHER

## 2021-10-11 ENCOUNTER — OFFICE VISIT (OUTPATIENT)
Dept: FAMILY MEDICINE CLINIC | Age: 46
End: 2021-10-11
Payer: MEDICARE

## 2021-10-11 VITALS
WEIGHT: 160 LBS | HEART RATE: 74 BPM | OXYGEN SATURATION: 97 % | DIASTOLIC BLOOD PRESSURE: 76 MMHG | SYSTOLIC BLOOD PRESSURE: 132 MMHG | BODY MASS INDEX: 31.41 KG/M2 | HEIGHT: 60 IN

## 2021-10-11 DIAGNOSIS — L20.9 ATOPIC DERMATITIS, UNSPECIFIED TYPE: Primary | ICD-10-CM

## 2021-10-11 PROCEDURE — 1036F TOBACCO NON-USER: CPT | Performed by: NURSE PRACTITIONER

## 2021-10-11 PROCEDURE — G8427 DOCREV CUR MEDS BY ELIG CLIN: HCPCS | Performed by: NURSE PRACTITIONER

## 2021-10-11 PROCEDURE — G8484 FLU IMMUNIZE NO ADMIN: HCPCS | Performed by: NURSE PRACTITIONER

## 2021-10-11 PROCEDURE — G8417 CALC BMI ABV UP PARAM F/U: HCPCS | Performed by: NURSE PRACTITIONER

## 2021-10-11 PROCEDURE — 99213 OFFICE O/P EST LOW 20 MIN: CPT | Performed by: NURSE PRACTITIONER

## 2021-10-11 RX ORDER — HYDROCORTISONE VALERATE 2 MG/G
OINTMENT TOPICAL
Qty: 1 EACH | Refills: 0 | Status: SHIPPED | OUTPATIENT
Start: 2021-10-11

## 2021-10-11 NOTE — PROGRESS NOTES
10/11/2021     Bronwyn Cunningham (:  1975) is a 55 y.o. female, here for evaluation of the following medical concerns:    Rash on right leg since at least 2021. Has not spread. Was seen by urgent care and given topical medicine which helped, but didn't resolve completely. She and caregiver are not sure what the cream was. .     Does not itch or bleed. Nothing makes it better or worse recently. Review of Systems    Prior to Visit Medications    Medication Sig Taking? Authorizing Provider   doxycycline hyclate (VIBRA-TABS) 100 MG tablet TAKE 1 TABLET BY MOUTH EVERY DAY Yes Kvng West MD   FLUoxetine (PROZAC) 40 MG capsule TAKE 1 CAPSULE BY MOUTH [place] Yes Kvng West MD   norethindrone-ethinyl estradiol (JUNEL FE 1/20) 1-20 MG-MCG per tablet TAKE 1 TABLET BY MOUTH EVERY DAY Yes Kvng West MD   cetirizine (ZYRTEC) 10 MG tablet Take 1 tablet by mouth nightly Yes Kvng West MD   ibuprofen (ADVIL;MOTRIN) 200 MG tablet Take 200 mg by mouth every 6 hours as needed for Pain Yes Historical Provider, MD   fluticasone (FLONASE) 50 MCG/ACT nasal spray 2 sprays by Nasal route daily as needed for Rhinitis Yes Kvng West MD   ipratropium (ATROVENT) 0.06 % nasal spray  Yes Historical Provider, MD   Pediatric Multivitamins-Fl (MULTI VIT/FL PO) Take  by mouth. Yes Historical Provider, MD   vitamin E 400 UNIT capsule Take 400 Units by mouth daily. Yes Historical Provider, MD   calcium carbonate (OSCAL) 500 MG TABS tablet Take 500 mg by mouth daily. Yes Historical Provider, MD   naproxen (NAPROSYN) 500 MG tablet Take 1 tablet by mouth 2 times daily for 20 doses.   Santana Quintana, APRN - CNP        Social History     Tobacco Use    Smoking status: Never Smoker    Smokeless tobacco: Never Used   Substance Use Topics    Alcohol use: No        Vitals:    10/11/21 1428   BP: 132/76   Site: Left Upper Arm   Position: Sitting   Cuff Size: Medium Adult   Pulse: 74   SpO2: 97%   Weight: 160 lb (72.6 kg)   Height: 5' 0.25\" (1.53 m)     Estimated body mass index is 30.99 kg/m² as calculated from the following:    Height as of this encounter: 5' 0.25\" (1.53 m). Weight as of this encounter: 160 lb (72.6 kg). Physical Exam  Vitals reviewed. Constitutional:       General: She is not in acute distress. Appearance: Normal appearance. She is normal weight. She is not ill-appearing, toxic-appearing or diaphoretic. HENT:      Head: Normocephalic and atraumatic. Nose: Nose normal.   Eyes:      Extraocular Movements: Extraocular movements intact. Pupils: Pupils are equal, round, and reactive to light. Pulmonary:      Effort: Pulmonary effort is normal.   Musculoskeletal:         General: Normal range of motion. Cervical back: Normal range of motion. Skin:     Coloration: Skin is not pale. Comments: Patchy flat dry flaky erythematous rash with multiple separate patches. No drainage, open wounds, sores, ulcers, lesions. No annular ring like rash   Neurological:      General: No focal deficit present. Mental Status: She is alert and oriented to person, place, and time. Mental status is at baseline. Psychiatric:         Mood and Affect: Mood normal.         Behavior: Behavior normal.         Thought Content: Thought content normal.         Judgment: Judgment normal.         ASSESSMENT/PLAN:  1. dermatitis, unspecified type  Orders Placed This Encounter   Medications    hydrocortisone valerate (WESTCORT) 0.2 % ointment     Sig: Apply topically BID for two weeks to left lower leg. Dispense:  1 each     Refill:  0       Follow up if not resolving. Try cerave cream.   If worsening with steroid topicalwe will try antifungal        All care gaps addressed     All questions answered    Discussed use, benefit, and side effects of prescribed medications.   Barriers to compliance discussed. All patient questions answered. Pt voiced understanding. Present to the ER for any emergent or acute symptoms not managed at home or in office. Please note that this chart was generated using dragon dictation software. Although every effort was made to ensure the accuracy of this automated transcription, some errors in transcription may have occurred. No follow-ups on file. An electronic signature was used to authenticate this note.     --SHANELL Colorado NP on 10/11/2021 at 5:04 PM

## 2021-12-02 DIAGNOSIS — L70.0 ACNE VULGARIS: ICD-10-CM

## 2021-12-03 RX ORDER — DOXYCYCLINE HYCLATE 100 MG
TABLET ORAL
Qty: 90 TABLET | Refills: 3 | Status: SHIPPED | OUTPATIENT
Start: 2021-12-03

## 2022-04-19 ENCOUNTER — TELEPHONE (OUTPATIENT)
Dept: FAMILY MEDICINE CLINIC | Age: 47
End: 2022-04-19

## 2022-04-19 NOTE — TELEPHONE ENCOUNTER
Patient called with complaints of left arm pain. Patient not experiencing any other sx. Requesting appt with pcp or to talk to pcp. Please advise.

## 2022-04-22 NOTE — TELEPHONE ENCOUNTER
Called patient to check on arm. No answer but left message that I was calling her back to check on her arm.  If patient needs and she can wait till next week based on severity, okay to schedule 4/26 at 1 pm for virtual telephone visit

## 2022-05-03 ENCOUNTER — OFFICE VISIT (OUTPATIENT)
Dept: FAMILY MEDICINE CLINIC | Age: 47
End: 2022-05-03
Payer: MEDICARE

## 2022-05-03 VITALS
BODY MASS INDEX: 30.8 KG/M2 | DIASTOLIC BLOOD PRESSURE: 84 MMHG | WEIGHT: 159 LBS | HEART RATE: 84 BPM | SYSTOLIC BLOOD PRESSURE: 122 MMHG | OXYGEN SATURATION: 96 %

## 2022-05-03 DIAGNOSIS — M25.512 ACUTE PAIN OF LEFT SHOULDER: Primary | ICD-10-CM

## 2022-05-03 PROCEDURE — G8427 DOCREV CUR MEDS BY ELIG CLIN: HCPCS | Performed by: FAMILY MEDICINE

## 2022-05-03 PROCEDURE — G8417 CALC BMI ABV UP PARAM F/U: HCPCS | Performed by: FAMILY MEDICINE

## 2022-05-03 PROCEDURE — 1036F TOBACCO NON-USER: CPT | Performed by: FAMILY MEDICINE

## 2022-05-03 PROCEDURE — 99213 OFFICE O/P EST LOW 20 MIN: CPT | Performed by: FAMILY MEDICINE

## 2022-05-03 RX ORDER — TIZANIDINE 2 MG/1
2 TABLET ORAL NIGHTLY PRN
Qty: 30 TABLET | Refills: 1 | Status: SHIPPED | OUTPATIENT
Start: 2022-05-03 | End: 2022-07-11

## 2022-05-03 NOTE — PATIENT INSTRUCTIONS
Patient Education        Rotator Cuff: Exercises  Introduction  Here are some examples of exercises for you to try. The exercises may be suggested for a condition or for rehabilitation. Start each exercise slowly. Ease off the exercises if you start to have pain. You will be told when to start these exercises and which ones will work bestfor you. How to do the exercises  Pendulum swing    If you have pain in your back, do not do this exercise. 1. Hold on to a table or the back of a chair with your good arm. Then bend forward a little and let your sore arm hang straight down. This exercise does not use the arm muscles. Rather, use your legs and your hips to create movement that makes your arm swing freely. 2. Use the movement from your hips and legs to guide the slightly swinging arm back and forth like a pendulum (or elephant trunk). Then guide it in circles that start small (about the size of a dinner plate). Make the circles a bit larger each day, as your pain allows. 3. Do this exercise for 5 minutes, 5 to 7 times each day. 4. As you have less pain, try bending over a little farther to do this exercise. This will increase the amount of movement at your shoulder. Posterior stretching exercise    1. Hold the elbow of your injured arm with your other hand. 2. Use your hand to pull your injured arm gently up and across your body. You will feel a gentle stretch across the back of your injured shoulder. 3. Hold for at least 15 to 30 seconds. Then slowly lower your arm. 4. Repeat 2 to 4 times. Up-the-back stretch    Your doctor or physical therapist may want you to wait to do this stretch until you have regained most of your range of motion and strength. You can do this stretch in different ways. Hold any of these stretches for at least 15 to 30seconds. Repeat them 2 to 4 times. 1. Light stretch: Put your hand in your back pocket. Let it rest there to stretch your shoulder. 2. Moderate stretch:  With your other hand, hold your injured arm (palm outward) behind your back by the wrist. Pull your arm up gently to stretch your shoulder. 3. Advanced stretch: Put a towel over your other shoulder. Put the hand of your injured arm behind your back. Now hold the back end of the towel. With the other hand, hold the front end of the towel in front of your body. Pull gently on the front end of the towel. This will bring your hand farther up your back to stretch your shoulder. Overhead stretch    1. Standing about an arm's length away, grasp onto a solid surface. You could use a countertop, a doorknob, or the back of a sturdy chair. 2. With your knees slightly bent, bend forward with your arms straight. Lower your upper body, and let your shoulders stretch. 3. As your shoulders are able to stretch farther, you may need to take a step or two backward. 4. Hold for at least 15 to 30 seconds. Then stand up and relax. If you had stepped back during your stretch, step forward so you can keep your hands on the solid surface. 5. Repeat 2 to 4 times. 1. S  Wall climbing (to the side)    Avoid any movement that is straight to your side, and be careful not to archyour back. Your arm should stay about 30 degrees to the front of your side. 1. Stand with your side to a wall so that your fingers can just touch it at an angle about 30 degrees toward the front of your body. 2. Walk the fingers of your injured arm up the wall as high as pain permits. Try not to shrug your shoulder up toward your ear as you move your arm up. 3. Hold that position for a count of at least 15 to 20.  4. Walk your fingers back down to the starting position. 5. Repeat at least 2 to 4 times. Try to reach higher each time. Wall climbing (to the front)    During this stretching exercise, be careful not to arch your back. 1. Face a wall, and stand so your fingers can just touch it.   2. Keeping your shoulder down, walk the fingers of your injured arm up the wall as high as pain permits. (Don't shrug your shoulder up toward your ear.)  3. Hold your arm in that position for at least 15 to 30 seconds. 4. Slowly walk your fingers back down to where you started. 5. Repeat at least 2 to 4 times. Try to reach higher each time. Shoulder blade squeeze    1. Stand with your arms at your sides, and squeeze your shoulder blades together. Do not raise your shoulders up as you squeeze. 2. Hold 6 seconds. 3. Repeat 8 to 12 times. 1.   Follow-up care is a key part of your treatment and safety. Be sure to make and go to all appointments, and call your doctor if you are having problems. It's also a good idea to know your test results and keep alist of the medicines you take. Where can you learn more? Go to https://[employer]cr[employer]. org and sign in to your [employer] account. Enter Vu Palm in the [facility] box to learn more about \"Rotator Cuff: Exercises. \"     If you do not have an account, please click on the \"Sign Up Now\" link. Current as of: July 1, 2021               Content Version: 13.2  © 2006-2022 Healthwise, Incorporated. Care instructions adapted under license by [facility] ([facility]). If you have questions about a medical condition or this instruction, always ask your healthcare professional. [place] [address] any warranty or liability for your use of this information.

## 2022-05-03 NOTE — PROGRESS NOTES
Plan:   1. Acute pain of left shoulder  -     tiZANidine (ZANAFLEX) 2 MG tablet; Take 1 tablet by mouth nightly as needed (muscle spasms), Disp-30 tablet, R-1Normal  -     diclofenac sodium (VOLTAREN) 1 % GEL; Apply 2 g topically 4 times daily as needed for Pain, Topical, 4 TIMES DAILY PRN Starting Tue 5/3/2022, Disp-150 g, R-0, Print  Treatment options discussed. See med orders     She wants to pursue the following treatment at this time to include Educational material distributed. Proper lifting, bending technique discussed. Stretching exercises discussed with patient and written info given. Rest  Ice  Heat  NSAIDs per medication orders. Muscle relaxants per medication orders. Pt to Call or return to clinic prn if these symptoms worsen or fail to improve as anticipated. Work/ school status:   Restrictions: full duty per patient preference    Discussed use, benefit, and side effects of prescribed medications. Patient instructed to notify if develop side effects from medications. Barriers to medication compliance addressed. All patient questions answered. Pt voiced understanding. Return in about 1 year (around 5/3/2023) for Wellness physical.  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 No numbness or tingling in her hands. Has tried cool water and cool compresses which has helped. Still able to do her ADLs otherwise. Moods are doing well. No side effects from current medications. She is working with [facility] and they are leukodystrophy clinic with her chronic cerebellar hypoplasia issues. ROS: Pertinent items are noted in HPI.  All other ROS negative     reports that she has never smoked. She has never used smokeless tobacco.      Physical Exam   Nursing note reviewed  /84 (Site: Left Upper Arm, Position: Sitting, Cuff Size: Medium Adult)   Pulse 84   Wt 159 lb (72.1 kg)   SpO2 96%   BMI 30.80 kg/m²   BP Readings from Last 3 Encounters:   05/03/22 122/84   10/11/21 132/76   02/11/20 128/74     Wt Readings from Last 3 Encounters:   05/03/22 159 lb (72.1 kg)   10/11/21 160 lb (72.6 kg)   02/11/20 162 lb 6.4 oz (73.7 kg)     No results found for this visit on 05/03/22. General appearance: cooperative   Neck: supple, symmetrical, trachea midline  Lungs: clear to auscultation bilaterally  Heart: regular rate and rhythm, S1, S2 normal,  Abdomen:  bowel sounds normal and soft, non-tender  MSK Shoulder exam:     on right: Normal active ROM, no tenderness, no impingement sign  SWELLING: none bilaterally  NECK: tender yes left anterior trapezius and SCM with palpable spasms on SCM     left Shoulder    Tenderness: Biceps Tendon       Range of Motion:      Active Abduction: 100     Passive Abduction: 100     Forward Flexion: Normal     Extension: Normal     External Rotation: Normal     Internal Rotation 0 Deg: Midthoracic     Internal Rotation 90 Deg: Normal           Tests:      Impingement: Positive     Daniel Post: Negative     Cross Arm: Positive     Drop Arm Test: Negative       Skin: Skin color, texture, turgor normal. No rashes or lesions  Neurologic: Grossly normal   Psych: Alert and oriented, appropriate affect.     Assessment and Plan: See above

## 2022-05-13 ENCOUNTER — TELEPHONE (OUTPATIENT)
Dept: FAMILY MEDICINE CLINIC | Age: 47
End: 2022-05-13

## 2022-05-13 DIAGNOSIS — H51.8 DIVERGENCE INSUFFICIENCY: Primary | ICD-10-CM

## 2022-05-13 DIAGNOSIS — Q04.3 CEREBELLAR HYPOPLASIA (HCC): ICD-10-CM

## 2022-05-13 DIAGNOSIS — H55.09 DOWNBEAT NYSTAGMUS: ICD-10-CM

## 2022-05-13 NOTE — TELEPHONE ENCOUNTER
Patient mother called requesting status for [facility] Ophthalmology order.  I see no ordered Javier Corado states she would like both daughters seen up at [facility]

## 2022-05-19 ENCOUNTER — TELEPHONE (OUTPATIENT)
Dept: FAMILY MEDICINE CLINIC | Age: 47
End: 2022-05-19

## 2022-05-26 DIAGNOSIS — M25.512 ACUTE PAIN OF LEFT SHOULDER: ICD-10-CM

## 2022-05-26 RX ORDER — TIZANIDINE 2 MG/1
2 TABLET ORAL NIGHTLY PRN
Qty: 30 TABLET | Refills: 1 | OUTPATIENT
Start: 2022-05-26

## 2022-05-31 DIAGNOSIS — M25.512 ACUTE PAIN OF LEFT SHOULDER: ICD-10-CM

## 2022-06-28 ENCOUNTER — HOSPITAL ENCOUNTER (OUTPATIENT)
Dept: WOMENS IMAGING | Age: 47
Discharge: HOME | End: 2022-06-28
Payer: MEDICARE

## 2022-06-28 DIAGNOSIS — Z12.31 SCREENING MAMMOGRAM, ENCOUNTER FOR: ICD-10-CM

## 2022-06-28 PROCEDURE — 77067 SCR MAMMO BI INCL CAD: CPT

## 2022-07-09 DIAGNOSIS — M25.512 ACUTE PAIN OF LEFT SHOULDER: ICD-10-CM

## 2022-07-11 RX ORDER — TIZANIDINE 2 MG/1
2 TABLET ORAL NIGHTLY PRN
Qty: 30 TABLET | Refills: 1 | Status: SHIPPED | OUTPATIENT
Start: 2022-07-11 | End: 2022-08-14

## 2022-08-11 DIAGNOSIS — M25.512 ACUTE PAIN OF LEFT SHOULDER: ICD-10-CM

## 2022-08-14 RX ORDER — TIZANIDINE 2 MG/1
2 TABLET ORAL NIGHTLY PRN
Qty: 30 TABLET | Refills: 2 | Status: SHIPPED | OUTPATIENT
Start: 2022-08-14

## 2022-09-08 DIAGNOSIS — F33.0 MAJOR DEPRESSIVE DISORDER, RECURRENT EPISODE, MILD (HCC): ICD-10-CM

## 2022-09-09 RX ORDER — FLUOXETINE HYDROCHLORIDE 40 MG/1
CAPSULE ORAL
Qty: 90 CAPSULE | Refills: 3 | Status: SHIPPED | OUTPATIENT
Start: 2022-09-09

## 2022-09-13 ENCOUNTER — TELEPHONE (OUTPATIENT)
Dept: FAMILY MEDICINE CLINIC | Age: 47
End: 2022-09-13

## 2022-09-13 DIAGNOSIS — Q04.3 CEREBELLAR HYPOPLASIA (HCC): Primary | ICD-10-CM

## 2022-09-13 DIAGNOSIS — H55.09 DOWNBEAT NYSTAGMUS: ICD-10-CM

## 2022-09-13 DIAGNOSIS — R27.0 ATAXIA: ICD-10-CM

## 2022-09-13 NOTE — TELEPHONE ENCOUNTER
Patients mother called requesting a referral to [facility] - neurology   P; [phone]  F: [phone]  Referral from last year .

## 2022-11-11 DIAGNOSIS — M25.512 ACUTE PAIN OF LEFT SHOULDER: ICD-10-CM

## 2022-11-11 RX ORDER — TIZANIDINE 2 MG/1
2 TABLET ORAL NIGHTLY PRN
Qty: 90 TABLET | Refills: 1 | Status: SHIPPED | OUTPATIENT
Start: 2022-11-11

## 2023-01-31 DIAGNOSIS — L70.0 ACNE VULGARIS: ICD-10-CM

## 2023-01-31 RX ORDER — DOXYCYCLINE HYCLATE 100 MG
TABLET ORAL
Qty: 90 TABLET | Refills: 3 | Status: SHIPPED | OUTPATIENT
Start: 2023-01-31

## 2023-04-04 ENCOUNTER — TELEPHONE (OUTPATIENT)
Dept: FAMILY MEDICINE CLINIC | Age: 48
End: 2023-04-04

## 2023-04-04 NOTE — PROGRESS NOTES
Spoke with patients mother Jazmín Suarezvais, who states that patient was seen at [facility] for her x-rays. She will call the office to follow up tomorrow morning.

## 2023-04-26 DIAGNOSIS — L70.0 ACNE VULGARIS: ICD-10-CM

## 2023-04-26 DIAGNOSIS — F33.0 MAJOR DEPRESSIVE DISORDER, RECURRENT EPISODE, MILD (HCC): ICD-10-CM

## 2023-04-26 RX ORDER — DOXYCYCLINE HYCLATE 100 MG
100 TABLET ORAL DAILY
Qty: 90 TABLET | Refills: 3 | Status: SHIPPED | OUTPATIENT
Start: 2023-04-26

## 2023-04-26 RX ORDER — FLUOXETINE HYDROCHLORIDE 40 MG/1
CAPSULE ORAL
Qty: 90 CAPSULE | Refills: 3 | Status: SHIPPED | OUTPATIENT
Start: 2023-04-26

## 2023-05-02 ENCOUNTER — OFFICE VISIT (OUTPATIENT)
Dept: FAMILY MEDICINE CLINIC | Age: 48
End: 2023-05-02
Payer: MEDICARE

## 2023-05-02 VITALS
BODY MASS INDEX: 32.75 KG/M2 | DIASTOLIC BLOOD PRESSURE: 80 MMHG | HEIGHT: 60 IN | OXYGEN SATURATION: 99 % | HEART RATE: 107 BPM | WEIGHT: 166.8 LBS | SYSTOLIC BLOOD PRESSURE: 124 MMHG

## 2023-05-02 DIAGNOSIS — Z13.6 ENCOUNTER FOR LIPID SCREENING FOR CARDIOVASCULAR DISEASE: ICD-10-CM

## 2023-05-02 DIAGNOSIS — Z78.0 POSTMENOPAUSAL: ICD-10-CM

## 2023-05-02 DIAGNOSIS — Z12.31 ENCOUNTER FOR SCREENING MAMMOGRAM FOR BREAST CANCER: ICD-10-CM

## 2023-05-02 DIAGNOSIS — S42.402D CLOSED FRACTURE OF LEFT ELBOW WITH ROUTINE HEALING: ICD-10-CM

## 2023-05-02 DIAGNOSIS — Z00.00 WELL ADULT EXAM: Primary | ICD-10-CM

## 2023-05-02 DIAGNOSIS — Z13.220 ENCOUNTER FOR LIPID SCREENING FOR CARDIOVASCULAR DISEASE: ICD-10-CM

## 2023-05-02 DIAGNOSIS — F33.0 MAJOR DEPRESSIVE DISORDER, RECURRENT EPISODE, MILD (HCC): ICD-10-CM

## 2023-05-02 PROCEDURE — 99396 PREV VISIT EST AGE 40-64: CPT | Performed by: FAMILY MEDICINE

## 2023-05-02 SDOH — ECONOMIC STABILITY: FOOD INSECURITY: WITHIN THE PAST 12 MONTHS, YOU WORRIED THAT YOUR FOOD WOULD RUN OUT BEFORE YOU GOT MONEY TO BUY MORE.: NEVER TRUE

## 2023-05-02 SDOH — ECONOMIC STABILITY: HOUSING INSECURITY
IN THE LAST 12 MONTHS, WAS THERE A TIME WHEN YOU DID NOT HAVE A STEADY PLACE TO SLEEP OR SLEPT IN A SHELTER (INCLUDING NOW)?: NO

## 2023-05-02 SDOH — ECONOMIC STABILITY: FOOD INSECURITY: WITHIN THE PAST 12 MONTHS, THE FOOD YOU BOUGHT JUST DIDN'T LAST AND YOU DIDN'T HAVE MONEY TO GET MORE.: NEVER TRUE

## 2023-05-02 SDOH — ECONOMIC STABILITY: INCOME INSECURITY: HOW HARD IS IT FOR YOU TO PAY FOR THE VERY BASICS LIKE FOOD, HOUSING, MEDICAL CARE, AND HEATING?: NOT HARD AT ALL

## 2023-05-02 ASSESSMENT — PATIENT HEALTH QUESTIONNAIRE - PHQ9
8. MOVING OR SPEAKING SO SLOWLY THAT OTHER PEOPLE COULD HAVE NOTICED. OR THE OPPOSITE, BEING SO FIGETY OR RESTLESS THAT YOU HAVE BEEN MOVING AROUND A LOT MORE THAN USUAL: 0
7. TROUBLE CONCENTRATING ON THINGS, SUCH AS READING THE NEWSPAPER OR WATCHING TELEVISION: 0
SUM OF ALL RESPONSES TO PHQ QUESTIONS 1-9: 2
4. FEELING TIRED OR HAVING LITTLE ENERGY: 0
9. THOUGHTS THAT YOU WOULD BE BETTER OFF DEAD, OR OF HURTING YOURSELF: 0
DEPRESSION UNABLE TO ASSESS: FUNCTIONAL CAPACITY MOTIVATION LIMITS ACCURACY
10. IF YOU CHECKED OFF ANY PROBLEMS, HOW DIFFICULT HAVE THESE PROBLEMS MADE IT FOR YOU TO DO YOUR WORK, TAKE CARE OF THINGS AT HOME, OR GET ALONG WITH OTHER PEOPLE: 0
SUM OF ALL RESPONSES TO PHQ QUESTIONS 1-9: 2
3. TROUBLE FALLING OR STAYING ASLEEP: 0
2. FEELING DOWN, DEPRESSED OR HOPELESS: 1
5. POOR APPETITE OR OVEREATING: 0
SUM OF ALL RESPONSES TO PHQ9 QUESTIONS 1 & 2: 2
1. LITTLE INTEREST OR PLEASURE IN DOING THINGS: 1
SUM OF ALL RESPONSES TO PHQ QUESTIONS 1-9: 2
SUM OF ALL RESPONSES TO PHQ QUESTIONS 1-9: 2
6. FEELING BAD ABOUT YOURSELF - OR THAT YOU ARE A FAILURE OR HAVE LET YOURSELF OR YOUR FAMILY DOWN: 0

## 2023-05-02 NOTE — PROGRESS NOTES
Plan:   1. Well adult exam  2. Closed fracture of left elbow with routine healing  3. Encounter for screening mammogram for breast cancer  -     CYRIL DIGITAL SCREEN W OR WO CAD BILATERAL; Future  4. Major depressive disorder, recurrent episode, mild (HCC)  Assessment & Plan:  Stable on prozac. Will continue to follow clinically    Orders:  -     TSH; Future  -     CBC; Future  -     Basic Metabolic Panel, Fasting; Future  5. Postmenopausal  -     DEXA BONE DENSITY AXIAL SKELETON; Future  6. Encounter for lipid screening for cardiovascular disease  -     Lipid, Fasting; Future    Overall healing for left elbow fracture going well with gentle HEP stretching program. Following closely with [facility] ortho. Pain rare but prn tyenol and ibuprofen helpful. Work/ school status:   Restrictions: awaiting work release back to [employer] per Ortho team.  All patient questions answered. Pt voiced understanding. Return in about 1 year (around 5/2/2024) for Dual: Medicare AWV and Chronic conditions.  -----------------------------------------------------------------------------------------------            Chief Complaint   Patient presents with    Annual Exam     Recovering from left elbow fracture. Pain mild and intermittent and no current sling. Seeing ortho next week. No hand paresthesia. Also hit nose during fall but sinuses feel fine. Taking zyrtec prn for hay fever. Moods doing fine on prozac. also doing fine without OCP. Awaiting to get release to get back to work at [address] will.        Past Medical History:   Diagnosis Date    Cerebellar hypoplasia ([facility]     Closed fracture of 4th metacarpal 1/24/2014    Depression     Dr. Bethany Dutta     Developmental delay     Environmental allergies     Finger pain, left- 5th digit MCP 1/20/2014    Fracture of lumbar vertebra ([facility] [facility] 2006    Pedicle fracture L4    Headache(784.0)     neuro-opthamaology [facility] Dr. Carmine Bingham (hyperlipidemia)     diet controlling    Knee pain

## 2023-05-05 ENCOUNTER — HOSPITAL ENCOUNTER (OUTPATIENT)
Age: 48
Discharge: HOME | End: 2023-05-05
Payer: MEDICARE

## 2023-05-05 DIAGNOSIS — Z13.6 ENCOUNTER FOR LIPID SCREENING FOR CARDIOVASCULAR DISEASE: ICD-10-CM

## 2023-05-05 DIAGNOSIS — F33.0 MAJOR DEPRESSIVE DISORDER, RECURRENT EPISODE, MILD (HCC): ICD-10-CM

## 2023-05-05 DIAGNOSIS — Z13.220 ENCOUNTER FOR LIPID SCREENING FOR CARDIOVASCULAR DISEASE: ICD-10-CM

## 2023-05-05 LAB
ANION GAP SERPL CALCULATED.3IONS-SCNC: 11 MMOL/L (ref 4–16)
BUN SERPL-MCNC: 16 MG/DL (ref 6–23)
CALCIUM SERPL-MCNC: 8.9 MG/DL (ref 8.3–10.6)
CHLORIDE BLD-SCNC: 103 MMOL/L (ref 99–110)
CHOLESTEROL, FASTING: 196 MG/DL
CO2: 23 MMOL/L (ref 21–32)
CREAT SERPL-MCNC: 0.6 MG/DL (ref 0.6–1.1)
GFR SERPL CREATININE-BSD FRML MDRD: >60 ML/MIN/1.73M2
GLUCOSE P FAST SERPL-MCNC: 96 MG/DL (ref 70–99)
HCT VFR BLD CALC: 42.5 % (ref 37–47)
HDLC SERPL-MCNC: 47 MG/DL
HEMOGLOBIN: 13.5 GM/DL (ref 12.5–16)
LDLC SERPL CALC-MCNC: 129 MG/DL
MCH RBC QN AUTO: 27.3 PG (ref 27–31)
MCHC RBC AUTO-ENTMCNC: 31.8 % (ref 32–36)
MCV RBC AUTO: 85.9 FL (ref 78–100)
PDW BLD-RTO: 14.5 % (ref 11.7–14.9)
PLATELET # BLD: 257 K/CU MM (ref 140–440)
PMV BLD AUTO: 11 FL (ref 7.5–11.1)
POTASSIUM SERPL-SCNC: 4.4 MMOL/L (ref 3.5–5.1)
RBC # BLD: 4.95 M/CU MM (ref 4.2–5.4)
SODIUM BLD-SCNC: 137 MMOL/L (ref 135–145)
TRIGLYCERIDE, FASTING: 102 MG/DL
TSH SERPL DL<=0.005 MIU/L-ACNC: 1.45 UIU/ML (ref 0.27–4.2)
WBC # BLD: 7.4 K/CU MM (ref 4–10.5)

## 2023-05-05 PROCEDURE — 84443 ASSAY THYROID STIM HORMONE: CPT

## 2023-05-05 PROCEDURE — 80061 LIPID PANEL: CPT

## 2023-05-05 PROCEDURE — 80048 BASIC METABOLIC PNL TOTAL CA: CPT

## 2023-05-05 PROCEDURE — 36415 COLL VENOUS BLD VENIPUNCTURE: CPT

## 2023-05-05 PROCEDURE — 85027 COMPLETE CBC AUTOMATED: CPT

## 2023-05-10 ENCOUNTER — OFFICE VISIT (OUTPATIENT)
Dept: ORTHOPEDIC SURGERY | Age: 48
End: 2023-05-10
Payer: MEDICARE

## 2023-05-10 VITALS — HEART RATE: 77 BPM | OXYGEN SATURATION: 97 % | RESPIRATION RATE: 14 BRPM

## 2023-05-10 DIAGNOSIS — S42.402D CLOSED FRACTURE OF LEFT ELBOW WITH ROUTINE HEALING: Primary | ICD-10-CM

## 2023-05-10 PROCEDURE — G8427 DOCREV CUR MEDS BY ELIG CLIN: HCPCS | Performed by: PHYSICIAN ASSISTANT

## 2023-05-10 PROCEDURE — G8417 CALC BMI ABV UP PARAM F/U: HCPCS | Performed by: PHYSICIAN ASSISTANT

## 2023-05-10 PROCEDURE — 99212 OFFICE O/P EST SF 10 MIN: CPT | Performed by: PHYSICIAN ASSISTANT

## 2023-05-10 PROCEDURE — 1036F TOBACCO NON-USER: CPT | Performed by: PHYSICIAN ASSISTANT

## 2023-05-10 ASSESSMENT — ENCOUNTER SYMPTOMS
RESPIRATORY NEGATIVE: 1
GASTROINTESTINAL NEGATIVE: 1
EYES NEGATIVE: 1

## 2023-05-10 NOTE — PROGRESS NOTES
Review of Systems   Constitutional: Negative. HENT: Negative. Eyes: Negative. Respiratory: Negative. Cardiovascular: Negative. Gastrointestinal: Negative. Genitourinary: Negative. Musculoskeletal:  Positive for arthralgias. Skin: Negative. Neurological: Negative. Psychiatric/Behavioral: Negative. HPI:Bronwyn Casillas is a 50 y.o. right-hand-dominant female return to the office today for fracture of the left radial neck follow-up. She states she is not having a whole lot of pain at the elbow but having some pain in the wrist.      Past Medical History:   Diagnosis Date    Cerebellar hypoplasia ([address]     Closed fracture of 4th metacarpal 1/24/2014    Depression     Dr. Garcia Else     Developmental delay     Environmental allergies     Finger pain, left- 5th digit MCP 1/20/2014    Fracture of lumbar vertebra ([address] 2006    Pedicle fracture L4    Headache(784.0)     neuro-opthamaology [facility] Dr. Margaret Love (hyperlipidemia)     diet controlling    Knee pain 11/16/2012    Left knee pain 10/2/2012    Unsteady gait     h/o cerebellar hypoplasia    Uses walker 2/10/2020    Weight gain        Past Surgical History:   Procedure Laterality Date    EYE SURGERY      FRACTURE SURGERY  2006    back, ( back brace ) L4 pedicle at [employer] working out       No family history on file.     Social History     Socioeconomic History    Marital status: Single     Spouse name: None    Number of children: None    Years of education: None    Highest education level: None   Tobacco Use    Smoking status: Never    Smokeless tobacco: Never   Substance and Sexual Activity    Alcohol use: No    Drug use: No     Social Determinants of Health     Financial Resource Strain: Low Risk     Difficulty of Paying Living Expenses: Not hard at all   Food Insecurity: No Food Insecurity    Worried About Running Out of Food in the Last Year: Never true    Ran Out of Food in the Last Year: Never true   Transportation Needs: Unknown

## 2023-05-10 NOTE — PROGRESS NOTES
Patient is here today for a follow up for left proximal radius fracture. She feels that she does have mild discomfort and symptoms are gradually improving.

## 2023-05-10 NOTE — PATIENT INSTRUCTIONS
Continue weight bear as tolerated  Continue range of motion and exercises   Ice and elevate as needed  Tylenol or Motrin for pain  Follow up in 6 weeks.

## 2023-08-23 ENCOUNTER — HOSPITAL ENCOUNTER (OUTPATIENT)
Dept: WOMENS IMAGING | Age: 48
Discharge: HOME | End: 2023-08-23
Payer: MEDICARE

## 2023-08-23 DIAGNOSIS — Z12.31 ENCOUNTER FOR SCREENING MAMMOGRAM FOR BREAST CANCER: ICD-10-CM

## 2023-08-23 DIAGNOSIS — Z78.0 POSTMENOPAUSAL: ICD-10-CM

## 2023-08-23 PROCEDURE — 77063 BREAST TOMOSYNTHESIS BI: CPT

## 2023-08-23 PROCEDURE — 77080 DXA BONE DENSITY AXIAL: CPT

## 2023-10-02 ENCOUNTER — TELEPHONE (OUTPATIENT)
Dept: FAMILY MEDICINE CLINIC | Age: 48
End: 2023-10-02

## 2023-10-02 NOTE — TELEPHONE ENCOUNTER
----- Message from [address] sent at 9/29/2023  4:52 PM EDT -----  Subject: Results Request    QUESTIONS  Results: Bone Density ;  Ordered by: Arlyn Blank   Date Performed: 2023-06-26  ---------------------------------------------------------------------------  --------------  Meldon Servant INFO    [phone]; OK to leave message on voicemail  ---------------------------------------------------------------------------  --------------

## 2023-10-05 NOTE — TELEPHONE ENCOUNTER
Please let patient know that she has some decreased bone density as resulted from the scan. We can consider starting on vitamind D and calcium supplements to help with this and recheck in 2 years.       I would recommend Caltrate with Vit D- 1 tablet daily to start (over the counter)

## 2024-01-22 NOTE — TELEPHONE ENCOUNTER
[employer], [facility]  Primary Care Practitioner : Jorge Linares    Patient / Family Goal:  Patient / Family Goal: Return home.    Plan:  1. Continue to follow patient through hospitalization and provide discharge planning in collaboration with patient, family, physicians and ancillary services.     2. Utilize community resources to ensure a safe discharge.

## 2024-02-05 PROBLEM — M85.89 OSTEOPENIA OF MULTIPLE SITES: Status: ACTIVE | Noted: 2024-02-05

## 2024-05-13 ENCOUNTER — TELEPHONE (OUTPATIENT)
Dept: FAMILY MEDICINE CLINIC | Age: 49
End: 2024-05-13

## 2024-05-13 NOTE — TELEPHONE ENCOUNTER
Pt is not able to attend her visit tomorrow. She was scheduled for a Physical a year ago. Pt now has a caregiver that can only bring pt from 10:30 am to 2:00 pm. Where would PCP like pt be to scheduled?

## 2024-05-21 ENCOUNTER — OFFICE VISIT (OUTPATIENT)
Dept: FAMILY MEDICINE CLINIC | Age: 49
End: 2024-05-21
Payer: MEDICARE

## 2024-05-21 VITALS
HEART RATE: 96 BPM | WEIGHT: 171 LBS | OXYGEN SATURATION: 99 % | BODY MASS INDEX: 33.57 KG/M2 | DIASTOLIC BLOOD PRESSURE: 80 MMHG | HEIGHT: 60 IN | SYSTOLIC BLOOD PRESSURE: 126 MMHG

## 2024-05-21 DIAGNOSIS — F33.0 MAJOR DEPRESSIVE DISORDER, RECURRENT EPISODE, MILD (HCC): Primary | ICD-10-CM

## 2024-05-21 DIAGNOSIS — Z12.31 ENCOUNTER FOR SCREENING MAMMOGRAM FOR BREAST CANCER: ICD-10-CM

## 2024-05-21 DIAGNOSIS — L70.0 ACNE VULGARIS: ICD-10-CM

## 2024-05-21 PROBLEM — S42.402D CLOSED FRACTURE OF LEFT ELBOW WITH ROUTINE HEALING: Status: RESOLVED | Noted: 2023-05-02 | Resolved: 2024-05-21

## 2024-05-21 PROCEDURE — G8427 DOCREV CUR MEDS BY ELIG CLIN: HCPCS | Performed by: FAMILY MEDICINE

## 2024-05-21 PROCEDURE — G8417 CALC BMI ABV UP PARAM F/U: HCPCS | Performed by: FAMILY MEDICINE

## 2024-05-21 PROCEDURE — 1036F TOBACCO NON-USER: CPT | Performed by: FAMILY MEDICINE

## 2024-05-21 PROCEDURE — 99213 OFFICE O/P EST LOW 20 MIN: CPT | Performed by: FAMILY MEDICINE

## 2024-05-21 RX ORDER — DOXYCYCLINE HYCLATE 100 MG
100 TABLET ORAL DAILY
Qty: 90 TABLET | Refills: 3 | Status: SHIPPED | OUTPATIENT
Start: 2024-05-21

## 2024-05-21 SDOH — ECONOMIC STABILITY: FOOD INSECURITY: WITHIN THE PAST 12 MONTHS, THE FOOD YOU BOUGHT JUST DIDN'T LAST AND YOU DIDN'T HAVE MONEY TO GET MORE.: NEVER TRUE

## 2024-05-21 SDOH — ECONOMIC STABILITY: INCOME INSECURITY: HOW HARD IS IT FOR YOU TO PAY FOR THE VERY BASICS LIKE FOOD, HOUSING, MEDICAL CARE, AND HEATING?: NOT VERY HARD

## 2024-05-21 SDOH — ECONOMIC STABILITY: FOOD INSECURITY: WITHIN THE PAST 12 MONTHS, YOU WORRIED THAT YOUR FOOD WOULD RUN OUT BEFORE YOU GOT MONEY TO BUY MORE.: NEVER TRUE

## 2024-05-21 ASSESSMENT — PATIENT HEALTH QUESTIONNAIRE - PHQ9
8. MOVING OR SPEAKING SO SLOWLY THAT OTHER PEOPLE COULD HAVE NOTICED. OR THE OPPOSITE, BEING SO FIGETY OR RESTLESS THAT YOU HAVE BEEN MOVING AROUND A LOT MORE THAN USUAL: NOT AT ALL
DEPRESSION UNABLE TO ASSESS: FUNCTIONAL CAPACITY MOTIVATION LIMITS ACCURACY
10. IF YOU CHECKED OFF ANY PROBLEMS, HOW DIFFICULT HAVE THESE PROBLEMS MADE IT FOR YOU TO DO YOUR WORK, TAKE CARE OF THINGS AT HOME, OR GET ALONG WITH OTHER PEOPLE: NOT DIFFICULT AT ALL
4. FEELING TIRED OR HAVING LITTLE ENERGY: NOT AT ALL
SUM OF ALL RESPONSES TO PHQ QUESTIONS 1-9: 0
SUM OF ALL RESPONSES TO PHQ QUESTIONS 1-9: 0
SUM OF ALL RESPONSES TO PHQ9 QUESTIONS 1 & 2: 0
2. FEELING DOWN, DEPRESSED OR HOPELESS: NOT AT ALL
SUM OF ALL RESPONSES TO PHQ QUESTIONS 1-9: 0
3. TROUBLE FALLING OR STAYING ASLEEP: NOT AT ALL
6. FEELING BAD ABOUT YOURSELF - OR THAT YOU ARE A FAILURE OR HAVE LET YOURSELF OR YOUR FAMILY DOWN: NOT AT ALL
1. LITTLE INTEREST OR PLEASURE IN DOING THINGS: NOT AT ALL
9. THOUGHTS THAT YOU WOULD BE BETTER OFF DEAD, OR OF HURTING YOURSELF: NOT AT ALL
7. TROUBLE CONCENTRATING ON THINGS, SUCH AS READING THE NEWSPAPER OR WATCHING TELEVISION: NOT AT ALL
SUM OF ALL RESPONSES TO PHQ QUESTIONS 1-9: 0
5. POOR APPETITE OR OVEREATING: NOT AT ALL

## 2024-05-21 NOTE — PROGRESS NOTES
Plan:     1. Major depressive disorder, recurrent episode, mild (HCC)-  slight flare as she is working through still mourning the loss of her dad last year and that her mom recently got remarried over the weekend.  She plans to continue on Prozac and declines any other referral for support for her moods.   2. Acne vulgaris  -     doxycycline hyclate (VIBRA-TABS) 100 MG tablet; Take 1 tablet by mouth daily, Disp-90 tablet, R-3Normal  3. Encounter for screening mammogram for breast cancer  -     CYRIL DIGITAL SCREEN W OR WO CAD BILATERAL; Future     All patient questions answered.  Pt voiced understanding.     Return in about 6 months (around 11/21/2024) for Dual: Medicare AWV and Chronic conditions.  -----------------------------------------------------------------------------------------------            Chief Complaint   Patient presents with    Acne    Depression       Patient is here with new caregiver Lesia (since April) who transported her to today's appointment. Patient reports feeling annoyed today and has been since the weekend. She admits she is annoyed that her mom got remarried this weekend, despite admitting that she does like Dean her new step fathers. She is also frustrated having to stay at their house for the next 2 weeks while they are [place] the [place] and misses her cat.  She reports taking her prozac regularly. She was let go from [employer] and not currently working. She isn't sleeping well and appetite has been the same .        ROS: Pertinent items are noted in HPI. All other ROS negative     reports that she has never smoked. She has never used smokeless tobacco.      Physical Exam   Nursing note reviewed  /80 (Site: Right Upper Arm, Position: Sitting)   Pulse 96   Ht 1.524 m (5')   Wt 77.6 kg (171 lb)   SpO2 99%   BMI 33.40 kg/m²   BP Readings from Last 3 Encounters:   05/21/24 126/80   05/02/23 124/80   04/12/23 116/62     Wt Readings from Last 3 Encounters:   05/21/24 77.6 kg (171 lb)

## 2024-09-10 ENCOUNTER — HOSPITAL ENCOUNTER (OUTPATIENT)
Dept: WOMENS IMAGING | Age: 49
Discharge: HOME | End: 2024-09-10
Attending: FAMILY MEDICINE
Payer: MEDICARE

## 2024-09-10 DIAGNOSIS — Z12.31 ENCOUNTER FOR SCREENING MAMMOGRAM FOR BREAST CANCER: ICD-10-CM

## 2024-09-10 PROCEDURE — 77063 BREAST TOMOSYNTHESIS BI: CPT

## 2024-09-13 ENCOUNTER — TELEPHONE (OUTPATIENT)
Dept: FAMILY MEDICINE CLINIC | Age: 49
End: 2024-09-13

## 2024-09-16 DIAGNOSIS — F33.0 MAJOR DEPRESSIVE DISORDER, RECURRENT EPISODE, MILD (HCC): ICD-10-CM

## 2024-09-16 DIAGNOSIS — L70.0 ACNE VULGARIS: ICD-10-CM

## 2024-09-16 RX ORDER — FLUOXETINE 40 MG/1
CAPSULE ORAL
Qty: 90 CAPSULE | Refills: 3 | Status: SHIPPED | OUTPATIENT
Start: 2024-09-16

## 2024-09-16 RX ORDER — DOXYCYCLINE HYCLATE 100 MG
100 TABLET ORAL DAILY
Qty: 90 TABLET | Refills: 3 | Status: SHIPPED | OUTPATIENT
Start: 2024-09-16

## 2024-12-17 ENCOUNTER — OFFICE VISIT (OUTPATIENT)
Dept: FAMILY MEDICINE CLINIC | Age: 49
End: 2024-12-17
Payer: MEDICAID

## 2024-12-17 VITALS
OXYGEN SATURATION: 96 % | DIASTOLIC BLOOD PRESSURE: 82 MMHG | HEART RATE: 69 BPM | SYSTOLIC BLOOD PRESSURE: 124 MMHG | WEIGHT: 183 LBS | BODY MASS INDEX: 35.93 KG/M2 | HEIGHT: 60 IN

## 2024-12-17 DIAGNOSIS — Z00.00 MEDICARE ANNUAL WELLNESS VISIT, SUBSEQUENT: Primary | ICD-10-CM

## 2024-12-17 DIAGNOSIS — Q04.3 CEREBELLAR HYPOPLASIA (HCC): ICD-10-CM

## 2024-12-17 PROCEDURE — G0439 PPPS, SUBSEQ VISIT: HCPCS | Performed by: FAMILY MEDICINE

## 2024-12-17 ASSESSMENT — PATIENT HEALTH QUESTIONNAIRE - PHQ9
10. IF YOU CHECKED OFF ANY PROBLEMS, HOW DIFFICULT HAVE THESE PROBLEMS MADE IT FOR YOU TO DO YOUR WORK, TAKE CARE OF THINGS AT HOME, OR GET ALONG WITH OTHER PEOPLE: NOT DIFFICULT AT ALL
SUM OF ALL RESPONSES TO PHQ QUESTIONS 1-9: 6
1. LITTLE INTEREST OR PLEASURE IN DOING THINGS: NEARLY EVERY DAY
SUM OF ALL RESPONSES TO PHQ QUESTIONS 1-9: 6
SUM OF ALL RESPONSES TO PHQ9 QUESTIONS 1 & 2: 6
7. TROUBLE CONCENTRATING ON THINGS, SUCH AS READING THE NEWSPAPER OR WATCHING TELEVISION: NOT AT ALL
4. FEELING TIRED OR HAVING LITTLE ENERGY: NOT AT ALL
8. MOVING OR SPEAKING SO SLOWLY THAT OTHER PEOPLE COULD HAVE NOTICED. OR THE OPPOSITE, BEING SO FIGETY OR RESTLESS THAT YOU HAVE BEEN MOVING AROUND A LOT MORE THAN USUAL: NOT AT ALL
SUM OF ALL RESPONSES TO PHQ QUESTIONS 1-9: 6
2. FEELING DOWN, DEPRESSED OR HOPELESS: NEARLY EVERY DAY
5. POOR APPETITE OR OVEREATING: NOT AT ALL
6. FEELING BAD ABOUT YOURSELF - OR THAT YOU ARE A FAILURE OR HAVE LET YOURSELF OR YOUR FAMILY DOWN: NOT AT ALL
9. THOUGHTS THAT YOU WOULD BE BETTER OFF DEAD, OR OF HURTING YOURSELF: NOT AT ALL
SUM OF ALL RESPONSES TO PHQ QUESTIONS 1-9: 6
3. TROUBLE FALLING OR STAYING ASLEEP: NOT AT ALL

## 2024-12-17 ASSESSMENT — LIFESTYLE VARIABLES
HOW MANY STANDARD DRINKS CONTAINING ALCOHOL DO YOU HAVE ON A TYPICAL DAY: PATIENT DOES NOT DRINK
HOW OFTEN DO YOU HAVE A DRINK CONTAINING ALCOHOL: NEVER

## 2024-12-17 NOTE — ASSESSMENT & PLAN NOTE
Stable and patient will continue to use seated wheeled walker and continue home health aid for ADLs

## 2024-12-17 NOTE — PROGRESS NOTES
Medicare Annual Wellness Visit    Bronwyn Cunningham is here for Medicare AWV    Assessment & Plan   Assessment & Plan  Medicare annual wellness visit, subsequent    Discussed vaccines and patient electing not to getting any vaccines this year.  She reports she still has the do the cologuard testing that she already has at home.          Cerebellar hypoplasia (HCC)   Stable and patient will continue to use seated wheeled walker and continue home health aid for ADLs          Recommendations for Preventive Services Due: see orders and patient instructions/AVS.  Recommended screening schedule for the next 5-10 years is provided to the patient in written form: see Patient Instructions/AVS.     Return for Dual: Medicare AWV and Chronic conditions.     Subjective   The following acute and/or chronic problems were also addressed today:  Chief Complaint   Patient presents with    Medicare AWV     Overall patient states she is doing fine.  Not currently working and no longer working at [employer].  Has HHAid daily to help with housework, making the bed, transportation, light housework.  Patient using wheeled seated walker due to gait instability from cerebral hypoplasia.   Taking prozac daily with no side effect.  No longer on OCP. Sleeping okay.  Enjoys having her cat Arina at home.     Patient's complete Health Risk Assessment and screening values have been reviewed and are found in Flowsheets. The following problems were reviewed today and where indicated follow up appointments were made and/or referrals ordered.    Positive Risk Factor Screenings with Interventions:    Fall Risk:  Do you feel unsteady or are you worried about falling? : (!) yes  2 or more falls in past year?: no  Fall with injury in past year?: no     Interventions:    Patient declines any further evaluation or treatment    Cognitive:   Clock Drawing Test (CDT): (!) Abnormal  Words recalled: 0 Words Recalled  Total Score: (!) 0  Total Score Interpretation: Abnormal

## 2025-03-23 ENCOUNTER — APPOINTMENT (OUTPATIENT)
Dept: GENERAL RADIOLOGY | Age: 50
DRG: 492 | End: 2025-03-23
Payer: MEDICARE

## 2025-03-23 ENCOUNTER — HOSPITAL ENCOUNTER (INPATIENT)
Age: 50
LOS: 4 days | Discharge: NURSING, REHAB OR LONG-TERM CARE | DRG: 492 | End: 2025-03-27
Attending: EMERGENCY MEDICINE | Admitting: HOSPITALIST
Payer: MEDICARE

## 2025-03-23 DIAGNOSIS — S82.892A CLOSED FRACTURE DISLOCATION OF LEFT ANKLE, INITIAL ENCOUNTER: Primary | ICD-10-CM

## 2025-03-23 LAB
ALBUMIN SERPL-MCNC: 4.2 G/DL (ref 3.4–5)
ALBUMIN/GLOB SERPL: 1.8 {RATIO} (ref 1.1–2.2)
ALP SERPL-CCNC: 82 U/L (ref 40–129)
ALT SERPL-CCNC: 14 U/L (ref 10–40)
ANION GAP SERPL CALCULATED.3IONS-SCNC: 13 MMOL/L (ref 9–17)
AST SERPL-CCNC: 20 U/L (ref 15–37)
BASOPHILS # BLD: 0.05 K/UL
BASOPHILS NFR BLD: 1 % (ref 0–1)
BILIRUB SERPL-MCNC: <0.2 MG/DL (ref 0–1)
BUN SERPL-MCNC: 15 MG/DL (ref 7–20)
CALCIUM SERPL-MCNC: 9.5 MG/DL (ref 8.3–10.6)
CHLORIDE SERPL-SCNC: 106 MMOL/L (ref 99–110)
CO2 SERPL-SCNC: 21 MMOL/L (ref 21–32)
CREAT SERPL-MCNC: 0.6 MG/DL (ref 0.6–1.1)
EOSINOPHIL # BLD: 0.03 K/UL
EOSINOPHILS RELATIVE PERCENT: 0 % (ref 0–3)
ERYTHROCYTE [DISTWIDTH] IN BLOOD BY AUTOMATED COUNT: 15 % (ref 11.7–14.9)
GFR, ESTIMATED: >90 ML/MIN/1.73M2
GLUCOSE SERPL-MCNC: 115 MG/DL (ref 74–99)
HCT VFR BLD AUTO: 42.4 % (ref 37–47)
HGB BLD-MCNC: 13.7 G/DL (ref 12.5–16)
IMM GRANULOCYTES # BLD AUTO: 0.04 K/UL
IMM GRANULOCYTES NFR BLD: 1 %
LYMPHOCYTES NFR BLD: 2.19 K/UL
LYMPHOCYTES RELATIVE PERCENT: 27 % (ref 24–44)
MCH RBC QN AUTO: 27.2 PG (ref 27–31)
MCHC RBC AUTO-ENTMCNC: 32.3 G/DL (ref 32–36)
MCV RBC AUTO: 84.3 FL (ref 78–100)
MONOCYTES NFR BLD: 0.47 K/UL
MONOCYTES NFR BLD: 6 % (ref 0–4)
NEUTROPHILS NFR BLD: 65 % (ref 36–66)
NEUTS SEG NFR BLD: 5.2 K/UL
PLATELET # BLD AUTO: 240 K/UL (ref 140–440)
PMV BLD AUTO: 10.8 FL (ref 7.5–11.1)
POTASSIUM SERPL-SCNC: 4 MMOL/L (ref 3.5–5.1)
PROT SERPL-MCNC: 6.5 G/DL (ref 6.4–8.2)
RBC # BLD AUTO: 5.03 M/UL (ref 4.2–5.4)
SODIUM SERPL-SCNC: 140 MMOL/L (ref 136–145)
WBC OTHER # BLD: 8 K/UL (ref 4–10.5)

## 2025-03-23 PROCEDURE — 6370000000 HC RX 637 (ALT 250 FOR IP): Performed by: STUDENT IN AN ORGANIZED HEALTH CARE EDUCATION/TRAINING PROGRAM

## 2025-03-23 PROCEDURE — 99152 MOD SED SAME PHYS/QHP 5/>YRS: CPT

## 2025-03-23 PROCEDURE — 80053 COMPREHEN METABOLIC PANEL: CPT

## 2025-03-23 PROCEDURE — 73610 X-RAY EXAM OF ANKLE: CPT

## 2025-03-23 PROCEDURE — 96376 TX/PRO/DX INJ SAME DRUG ADON: CPT

## 2025-03-23 PROCEDURE — 2500000003 HC RX 250 WO HCPCS: Performed by: EMERGENCY MEDICINE

## 2025-03-23 PROCEDURE — 73600 X-RAY EXAM OF ANKLE: CPT

## 2025-03-23 PROCEDURE — 85025 COMPLETE CBC W/AUTO DIFF WBC: CPT

## 2025-03-23 PROCEDURE — 99285 EMERGENCY DEPT VISIT HI MDM: CPT

## 2025-03-23 PROCEDURE — 6360000002 HC RX W HCPCS: Performed by: STUDENT IN AN ORGANIZED HEALTH CARE EDUCATION/TRAINING PROGRAM

## 2025-03-23 PROCEDURE — 1200000000 HC SEMI PRIVATE

## 2025-03-23 PROCEDURE — 2500000003 HC RX 250 WO HCPCS: Performed by: STUDENT IN AN ORGANIZED HEALTH CARE EDUCATION/TRAINING PROGRAM

## 2025-03-23 PROCEDURE — 96374 THER/PROPH/DIAG INJ IV PUSH: CPT

## 2025-03-23 PROCEDURE — 6360000002 HC RX W HCPCS: Performed by: EMERGENCY MEDICINE

## 2025-03-23 RX ORDER — SODIUM CHLORIDE 0.9 % (FLUSH) 0.9 %
5-40 SYRINGE (ML) INJECTION PRN
Status: DISCONTINUED | OUTPATIENT
Start: 2025-03-23 | End: 2025-03-27 | Stop reason: HOSPADM

## 2025-03-23 RX ORDER — OXYCODONE AND ACETAMINOPHEN 5; 325 MG/1; MG/1
1 TABLET ORAL EVERY 4 HOURS PRN
Status: DISCONTINUED | OUTPATIENT
Start: 2025-03-23 | End: 2025-03-24

## 2025-03-23 RX ORDER — ACETAMINOPHEN 650 MG/1
650 SUPPOSITORY RECTAL EVERY 6 HOURS PRN
Status: DISCONTINUED | OUTPATIENT
Start: 2025-03-23 | End: 2025-03-27 | Stop reason: HOSPADM

## 2025-03-23 RX ORDER — CETIRIZINE HYDROCHLORIDE 10 MG/1
10 TABLET ORAL NIGHTLY
Status: DISCONTINUED | OUTPATIENT
Start: 2025-03-23 | End: 2025-03-27 | Stop reason: HOSPADM

## 2025-03-23 RX ORDER — M-VIT,TX,IRON,MINS/CALC/FOLIC 27MG-0.4MG
1 TABLET ORAL EVERY EVENING
Status: DISCONTINUED | OUTPATIENT
Start: 2025-03-23 | End: 2025-03-27 | Stop reason: HOSPADM

## 2025-03-23 RX ORDER — POTASSIUM CHLORIDE 1500 MG/1
40 TABLET, EXTENDED RELEASE ORAL PRN
Status: DISCONTINUED | OUTPATIENT
Start: 2025-03-23 | End: 2025-03-27 | Stop reason: HOSPADM

## 2025-03-23 RX ORDER — DOXYCYCLINE HYCLATE 100 MG
100 TABLET ORAL DAILY
Status: DISCONTINUED | OUTPATIENT
Start: 2025-03-24 | End: 2025-03-27 | Stop reason: HOSPADM

## 2025-03-23 RX ORDER — ONDANSETRON 2 MG/ML
4 INJECTION INTRAMUSCULAR; INTRAVENOUS EVERY 6 HOURS PRN
Status: DISCONTINUED | OUTPATIENT
Start: 2025-03-23 | End: 2025-03-24

## 2025-03-23 RX ORDER — HYDROMORPHONE HYDROCHLORIDE 1 MG/ML
0.5 INJECTION, SOLUTION INTRAMUSCULAR; INTRAVENOUS; SUBCUTANEOUS ONCE
Status: COMPLETED | OUTPATIENT
Start: 2025-03-23 | End: 2025-03-23

## 2025-03-23 RX ORDER — OXYCODONE AND ACETAMINOPHEN 7.5; 325 MG/1; MG/1
1 TABLET ORAL EVERY 4 HOURS PRN
Status: DISCONTINUED | OUTPATIENT
Start: 2025-03-23 | End: 2025-03-24

## 2025-03-23 RX ORDER — ACETAMINOPHEN 325 MG/1
650 TABLET ORAL EVERY 6 HOURS PRN
Status: DISCONTINUED | OUTPATIENT
Start: 2025-03-23 | End: 2025-03-24

## 2025-03-23 RX ORDER — ENOXAPARIN SODIUM 100 MG/ML
40 INJECTION SUBCUTANEOUS DAILY
Status: DISCONTINUED | OUTPATIENT
Start: 2025-03-23 | End: 2025-03-27 | Stop reason: HOSPADM

## 2025-03-23 RX ORDER — SODIUM CHLORIDE 9 MG/ML
INJECTION, SOLUTION INTRAVENOUS PRN
Status: DISCONTINUED | OUTPATIENT
Start: 2025-03-23 | End: 2025-03-27 | Stop reason: HOSPADM

## 2025-03-23 RX ORDER — MAGNESIUM SULFATE IN WATER 40 MG/ML
2000 INJECTION, SOLUTION INTRAVENOUS PRN
Status: DISCONTINUED | OUTPATIENT
Start: 2025-03-23 | End: 2025-03-27 | Stop reason: HOSPADM

## 2025-03-23 RX ORDER — POLYETHYLENE GLYCOL 3350 17 G/17G
17 POWDER, FOR SOLUTION ORAL DAILY PRN
Status: DISCONTINUED | OUTPATIENT
Start: 2025-03-23 | End: 2025-03-27 | Stop reason: HOSPADM

## 2025-03-23 RX ORDER — CALCIUM CARBONATE 500(1250)
500 TABLET ORAL DAILY
Status: DISCONTINUED | OUTPATIENT
Start: 2025-03-24 | End: 2025-03-27 | Stop reason: HOSPADM

## 2025-03-23 RX ORDER — SODIUM CHLORIDE 0.9 % (FLUSH) 0.9 %
5-40 SYRINGE (ML) INJECTION EVERY 12 HOURS SCHEDULED
Status: DISCONTINUED | OUTPATIENT
Start: 2025-03-23 | End: 2025-03-27 | Stop reason: HOSPADM

## 2025-03-23 RX ORDER — ONDANSETRON 4 MG/1
4 TABLET, ORALLY DISINTEGRATING ORAL EVERY 8 HOURS PRN
Status: DISCONTINUED | OUTPATIENT
Start: 2025-03-23 | End: 2025-03-24

## 2025-03-23 RX ORDER — HYDROMORPHONE HYDROCHLORIDE 1 MG/ML
0.5 INJECTION, SOLUTION INTRAMUSCULAR; INTRAVENOUS; SUBCUTANEOUS EVERY 4 HOURS PRN
Status: DISCONTINUED | OUTPATIENT
Start: 2025-03-23 | End: 2025-03-24

## 2025-03-23 RX ORDER — KETAMINE HYDROCHLORIDE 10 MG/ML
1 INJECTION, SOLUTION INTRAMUSCULAR; INTRAVENOUS ONCE
Status: COMPLETED | OUTPATIENT
Start: 2025-03-23 | End: 2025-03-23

## 2025-03-23 RX ORDER — POTASSIUM CHLORIDE 7.45 MG/ML
10 INJECTION INTRAVENOUS PRN
Status: DISCONTINUED | OUTPATIENT
Start: 2025-03-23 | End: 2025-03-27 | Stop reason: HOSPADM

## 2025-03-23 RX ORDER — ONDANSETRON 2 MG/ML
4 INJECTION INTRAMUSCULAR; INTRAVENOUS EVERY 6 HOURS PRN
Status: DISCONTINUED | OUTPATIENT
Start: 2025-03-23 | End: 2025-03-23 | Stop reason: SDUPTHER

## 2025-03-23 RX ADMIN — KETAMINE HYDROCHLORIDE 83 MG: 10 INJECTION INTRAMUSCULAR; INTRAVENOUS at 14:23

## 2025-03-23 RX ADMIN — HYDROMORPHONE HYDROCHLORIDE 0.5 MG: 1 INJECTION, SOLUTION INTRAMUSCULAR; INTRAVENOUS; SUBCUTANEOUS at 13:43

## 2025-03-23 RX ADMIN — ONDANSETRON 4 MG: 2 INJECTION INTRAMUSCULAR; INTRAVENOUS at 18:52

## 2025-03-23 RX ADMIN — HYDROMORPHONE HYDROCHLORIDE 0.5 MG: 1 INJECTION, SOLUTION INTRAMUSCULAR; INTRAVENOUS; SUBCUTANEOUS at 19:51

## 2025-03-23 RX ADMIN — ENOXAPARIN SODIUM 40 MG: 100 INJECTION SUBCUTANEOUS at 17:54

## 2025-03-23 RX ADMIN — HYDROMORPHONE HYDROCHLORIDE 0.5 MG: 1 INJECTION, SOLUTION INTRAMUSCULAR; INTRAVENOUS; SUBCUTANEOUS at 15:08

## 2025-03-23 RX ADMIN — SODIUM CHLORIDE, PRESERVATIVE FREE 10 ML: 5 INJECTION INTRAVENOUS at 21:08

## 2025-03-23 RX ADMIN — Medication 1 TABLET: at 17:54

## 2025-03-23 RX ADMIN — CETIRIZINE HYDROCHLORIDE 10 MG: 10 TABLET, FILM COATED ORAL at 21:08

## 2025-03-23 ASSESSMENT — PAIN DESCRIPTION - PAIN TYPE: TYPE: ACUTE PAIN

## 2025-03-23 ASSESSMENT — PAIN SCALES - GENERAL
PAINLEVEL_OUTOF10: 10
PAINLEVEL_OUTOF10: 2
PAINLEVEL_OUTOF10: 1

## 2025-03-23 ASSESSMENT — PAIN DESCRIPTION - FREQUENCY: FREQUENCY: CONTINUOUS

## 2025-03-23 ASSESSMENT — PAIN DESCRIPTION - DESCRIPTORS
DESCRIPTORS: SHARP
DESCRIPTORS: DISCOMFORT

## 2025-03-23 ASSESSMENT — PAIN DESCRIPTION - LOCATION
LOCATION: ANKLE

## 2025-03-23 ASSESSMENT — PAIN DESCRIPTION - ORIENTATION
ORIENTATION: LEFT

## 2025-03-23 ASSESSMENT — PAIN DESCRIPTION - ONSET: ONSET: GRADUAL

## 2025-03-23 ASSESSMENT — PAIN - FUNCTIONAL ASSESSMENT
PAIN_FUNCTIONAL_ASSESSMENT: PREVENTS OR INTERFERES SOME ACTIVE ACTIVITIES AND ADLS
PAIN_FUNCTIONAL_ASSESSMENT: PREVENTS OR INTERFERES SOME ACTIVE ACTIVITIES AND ADLS

## 2025-03-23 NOTE — ED NOTES
ED TO INPATIENT SBAR HANDOFF    Patient Name: Bronwyn Cunningham   :  1975  50 y.o.   Preferred Name  Bronwyn  Family/Caregiver Present yes   Restraints no   C-SSRS: Risk of Suicide: No Risk  Sitter no   Sepsis Risk Score        Situation  No chief complaint on file.    Brief Description of Patient's Condition: 50 y.o. female that presents with left ankle pain after a fall.  Patient reports she fell walking out of [employer] and injured her left ankle.  No head injury or loss of consciousness.  No neck or back pains.  No pain in any location other than left ankle.  EMS was called and they noted obvious deformity to left ankle.  Patient was placed in a splint prehospital and IV was established.  IV fentanyl was given prehospital.  Patient on arrival reports minimal improvement with the IV pain medication.  No prior injury to the ankle.   Mental Status: alert  Arrived from: home    Imaging:   XR ANKLE LEFT (MIN 3 VIEWS)   Final Result      XR ANKLE LEFT (2 VIEWS)    (Results Pending)     Abnormal labs:   Abnormal Labs Reviewed   CBC WITH AUTO DIFFERENTIAL - Abnormal; Notable for the following components:       Result Value    RDW 15.0 (*)     Monocytes % 6 (*)     Immature Granulocytes % 1 (*)     All other components within normal limits   COMPREHENSIVE METABOLIC PANEL - Abnormal; Notable for the following components:    Glucose 115 (*)     All other components within normal limits        Background  History:   Past Medical History:   Diagnosis Date    Cerebellar hypoplasia (HCC)     Closed fracture of 4th metacarpal 2014    Closed fracture of left elbow with routine healing 2023    DAWN Ramirez, [facility]    Depression     Dr. Hidalgo     Developmental delay     Environmental allergies     Finger pain, left- 5th digit MCP 2014    Fracture of lumbar vertebra (HCC) 2006    Pedicle fracture L4    Headache(784.0)     neuro-opthamaology [facility] Dr. Joycelyn ORTIZ (hyperlipidemia)     diet controlling    Knee pain

## 2025-03-23 NOTE — H&P
V2.0  History and Physical      Name:  Bronwyn Cunningham /Age/Sex: 1975  (50 y.o. female)   MRN & CSN:  [id number] & [id number] Encounter Date/Time: 3/23/2025 3:12 PM EDT   Location:  [facility] PCP: Awa Figueroa MD       Hospital Day: 1    Assessment and Plan:   Bronwyn Cunningham is a 50 y.o. female with a pmh of Cerebellar hypoplasia who presents with Closed left ankle fracture, initial encounter    Hospital Problems           Last Modified POA    * (Principal) Closed left ankle fracture, initial encounter 3/23/2025 Yes       Plan:  Left ankle fracture  H/O ambulatory dysfunction  Patient presents post fall with pain in left ankle, found on imaging to have trimalleolar fracture  --ED physician reduced the fracture and splinted it  --Follow up on ortho consult, bedrest until weight bearing status is confirmed by ortho  --PT/OT consulted pending weight bearing status  --Analgesics as needed    H/O Cerebellar hypoplasia  --Stable  --Continue home regimen    Patient is full code and parents are her surrogate decision maker, mother is POA    Disposition:   Current Living situation: Home  Expected Disposition: TBD  Estimated D/C: 2-3 days    Diet No diet orders on file   DVT Prophylaxis [x] Lovenox, []  Heparin, [] SCDs, [] Ambulation,  [] Eliquis, [] Xarelto, [] Coumadin   Code Status No Order   Surrogate Decision Maker/ POA Parents: Mother is POA     Personally reviewed Lab Studies and Imaging     Discussed management of the case with Dr Collado and agree with recommendations for admission     Drugs that require monitoring for toxicity include lovenox and the method of monitoring was CBC        History from:     mother, electronic medical record    History of Present Illness:     Chief Complaint: S/P fall  Bronwyn Cunningham is a 50 y.o. female with pmh of Cerebellar hypoplasia who presents s/p fall with fracture of left ankle. She was HDS although hypertensive with unremarkable labs. XR of the ankle

## 2025-03-23 NOTE — ED PROVIDER NOTES
[facility] EMERGENCY DEPARTMENT  EMERGENCY DEPARTMENT ENCOUNTER      Pt Name: Bronwyn Cunningham  MRN:[id number]  Birthdate 1975  Date of evaluation: 3/23/2025      I was asked by the attending physician  Dr. Vj Collado to assist with this patient, specifically for  reduction evaluation and reduction for left ankle fracture/dislocation.    This note is for documentation for procedure only.  Please see Dr. Collado's documentation for full details on patient's history, exam, ED course and disposition.    A splint was applied by the ED staff under my supervision.  After application, I evaluated Bronwyn Cunningham and deemed the affected area to be satisfactorily immobilized. Their distal neuro-vascular exam remained unchanged from pre-splint application and without evidence of compartment syndrome. The patient tolerated the procedure well and without acute complications.         RADIOLOGY:   Non-plain film images such as CT, Ultrasound and MRI are read by the radiologist. IJohnathon PA-C have directly visualized the radiologic plain film image(s) with the below findings:    Interpretation per the Radiologist below, if available at the time of this note:    XR ANKLE LEFT (2 VIEWS)   Final Result      XR ANKLE LEFT (MIN 3 VIEWS)   Final Result           XR ANKLE LEFT (2 VIEWS)  Result Date: 3/23/2025  PROCEDURE: XR ANKLE LEFT (2 VIEWS) DATE OF EXAM:  3/23/2025 14:45 DEMOGRAPHICS: 50 years old Female INDICATION: post reduction COMPARISON: X-ray from earlier in the day. FINDINGS: 2 views of the Left ankle were obtained. Examination in an immobilized in contrast. Trimalleolar fracture dislocation is suggested here. Reduction is incomplete. IMPRESSION:  1.  Post immobilization cast film. This dictation was created with voice recognition software.  While attempts have  been made to review the dictation as it is transcribed, on occasion the spoken word can be misinterpreted by the technology leading to

## 2025-03-23 NOTE — ED TRIAGE NOTES
Pt arrived via EMS, fell walking out of [employer] this morning. Notable left ankle deformity. Denies LOC. Was able to feel EMS touching her foot, but pedal pulse wasn't palpated.  50mcg of fentanyl.

## 2025-03-23 NOTE — ED PROVIDER NOTES
Triage Chief Complaint:   No chief complaint on file.    [place]:  Bronwyn Cunningham is a 50 y.o. female that presents with left ankle pain after a fall.  Patient reports she fell walking out of [employer] and injured her left ankle.  No head injury or loss of consciousness.  No neck or back pains.  No pain in any location other than left ankle.  EMS was called and they noted obvious deformity to left ankle.  Patient was placed in a splint prehospital and IV was established.  IV fentanyl was given prehospital.  Patient on arrival reports minimal improvement with the IV pain medication.  No prior injury to the ankle.    ROS:  General:  No fevers, no chills  Respiratory:  No shortness of breath  Neurologic:  No numbness, no weakness  Extremities:  No edema, + pain  Skin:  No rash  Psych: No axienty    Past Medical History:   Diagnosis Date    Cerebellar hypoplasia (HCC)     Closed fracture of 4th metacarpal 01/24/2014    Closed fracture of left elbow with routine healing 05/02/2023    DAWN Ramirez, [facility]    Depression     Dr. Hidalgo     Developmental delay     Environmental allergies     Finger pain, left- 5th digit MCP 01/20/2014    Fracture of lumbar vertebra (HCC) 2006    Pedicle fracture L4    Headache(784.0)     neuro-opthamaology [facility] Dr. Joycelyn ORTIZ (hyperlipidemia)     diet controlling    Knee pain 11/16/2012    Left knee pain 10/02/2012    Unsteady gait     h/o cerebellar hypoplasia    Uses walker 02/10/2020    Weight gain      Past Surgical History:   Procedure Laterality Date    EYE SURGERY      FRACTURE SURGERY  2006    back, ( back brace ) L4 pedicle at [employer] working out     Family History   Problem Relation Age of Onset    Breast Cancer Neg Hx     Ovarian Cancer Neg Hx      Social History     Socioeconomic History    Marital status: Single     Spouse name: Not on file    Number of children: Not on file    Years of education: Not on file    Highest education level: Not on file   Occupational History    Not on

## 2025-03-24 ENCOUNTER — ANESTHESIA EVENT (OUTPATIENT)
Dept: OPERATING ROOM | Age: 50
DRG: 492 | End: 2025-03-24
Payer: MEDICARE

## 2025-03-24 ENCOUNTER — APPOINTMENT (OUTPATIENT)
Dept: GENERAL RADIOLOGY | Age: 50
DRG: 492 | End: 2025-03-24
Payer: MEDICARE

## 2025-03-24 LAB
ALBUMIN SERPL-MCNC: 3.8 G/DL (ref 3.4–5)
ALBUMIN/GLOB SERPL: 1.8 {RATIO} (ref 1.1–2.2)
ALP SERPL-CCNC: 71 U/L (ref 40–129)
ALT SERPL-CCNC: 10 U/L (ref 10–40)
ANION GAP SERPL CALCULATED.3IONS-SCNC: 11 MMOL/L (ref 9–17)
AST SERPL-CCNC: 21 U/L (ref 15–37)
BASOPHILS # BLD: 0.04 K/UL
BASOPHILS NFR BLD: 0 % (ref 0–1)
BILIRUB SERPL-MCNC: 0.3 MG/DL (ref 0–1)
BUN SERPL-MCNC: 12 MG/DL (ref 7–20)
CALCIUM SERPL-MCNC: 8.5 MG/DL (ref 8.3–10.6)
CHLORIDE SERPL-SCNC: 103 MMOL/L (ref 99–110)
CO2 SERPL-SCNC: 22 MMOL/L (ref 21–32)
CREAT SERPL-MCNC: 0.6 MG/DL (ref 0.6–1.1)
EOSINOPHIL # BLD: 0.01 K/UL
EOSINOPHILS RELATIVE PERCENT: 0 % (ref 0–3)
ERYTHROCYTE [DISTWIDTH] IN BLOOD BY AUTOMATED COUNT: 15.3 % (ref 11.7–14.9)
GFR, ESTIMATED: >90 ML/MIN/1.73M2
GLUCOSE SERPL-MCNC: 125 MG/DL (ref 74–99)
HCT VFR BLD AUTO: 38.2 % (ref 37–47)
HGB BLD-MCNC: 12.4 G/DL (ref 12.5–16)
IMM GRANULOCYTES # BLD AUTO: 0.03 K/UL
IMM GRANULOCYTES NFR BLD: 0 %
LYMPHOCYTES NFR BLD: 1.89 K/UL
LYMPHOCYTES RELATIVE PERCENT: 20 % (ref 24–44)
MCH RBC QN AUTO: 27.5 PG (ref 27–31)
MCHC RBC AUTO-ENTMCNC: 32.5 G/DL (ref 32–36)
MCV RBC AUTO: 84.7 FL (ref 78–100)
MONOCYTES NFR BLD: 0.63 K/UL
MONOCYTES NFR BLD: 7 % (ref 0–4)
NEUTROPHILS NFR BLD: 73 % (ref 36–66)
NEUTS SEG NFR BLD: 7.09 K/UL
PLATELET # BLD AUTO: 226 K/UL (ref 140–440)
PMV BLD AUTO: 10.9 FL (ref 7.5–11.1)
POTASSIUM SERPL-SCNC: 3.8 MMOL/L (ref 3.5–5.1)
PROT SERPL-MCNC: 5.9 G/DL (ref 6.4–8.2)
RBC # BLD AUTO: 4.51 M/UL (ref 4.2–5.4)
SODIUM SERPL-SCNC: 137 MMOL/L (ref 136–145)
WBC OTHER # BLD: 9.7 K/UL (ref 4–10.5)

## 2025-03-24 PROCEDURE — 7100000001 HC PACU RECOVERY - ADDTL 15 MIN: Performed by: ORTHOPAEDIC SURGERY

## 2025-03-24 PROCEDURE — 6370000000 HC RX 637 (ALT 250 FOR IP): Performed by: ORTHOPAEDIC SURGERY

## 2025-03-24 PROCEDURE — 6360000002 HC RX W HCPCS: Performed by: NURSE ANESTHETIST, CERTIFIED REGISTERED

## 2025-03-24 PROCEDURE — 76000 FLUOROSCOPY <1 HR PHYS/QHP: CPT

## 2025-03-24 PROCEDURE — 6360000002 HC RX W HCPCS: Performed by: ORTHOPAEDIC SURGERY

## 2025-03-24 PROCEDURE — 7100000000 HC PACU RECOVERY - FIRST 15 MIN: Performed by: ORTHOPAEDIC SURGERY

## 2025-03-24 PROCEDURE — 2500000003 HC RX 250 WO HCPCS: Performed by: ORTHOPAEDIC SURGERY

## 2025-03-24 PROCEDURE — 2580000003 HC RX 258

## 2025-03-24 PROCEDURE — 6370000000 HC RX 637 (ALT 250 FOR IP): Performed by: STUDENT IN AN ORGANIZED HEALTH CARE EDUCATION/TRAINING PROGRAM

## 2025-03-24 PROCEDURE — 3700000000 HC ANESTHESIA ATTENDED CARE: Performed by: ORTHOPAEDIC SURGERY

## 2025-03-24 PROCEDURE — 0QSK04Z REPOSITION LEFT FIBULA WITH INTERNAL FIXATION DEVICE, OPEN APPROACH: ICD-10-PCS | Performed by: ORTHOPAEDIC SURGERY

## 2025-03-24 PROCEDURE — 3700000001 HC ADD 15 MINUTES (ANESTHESIA): Performed by: ORTHOPAEDIC SURGERY

## 2025-03-24 PROCEDURE — 6360000002 HC RX W HCPCS: Performed by: ANESTHESIOLOGY

## 2025-03-24 PROCEDURE — 1200000000 HC SEMI PRIVATE

## 2025-03-24 PROCEDURE — 85025 COMPLETE CBC W/AUTO DIFF WBC: CPT

## 2025-03-24 PROCEDURE — 2500000003 HC RX 250 WO HCPCS: Performed by: STUDENT IN AN ORGANIZED HEALTH CARE EDUCATION/TRAINING PROGRAM

## 2025-03-24 PROCEDURE — 3600000006 HC SURGERY LEVEL 6 BASE: Performed by: ORTHOPAEDIC SURGERY

## 2025-03-24 PROCEDURE — 36415 COLL VENOUS BLD VENIPUNCTURE: CPT

## 2025-03-24 PROCEDURE — 6360000002 HC RX W HCPCS: Performed by: STUDENT IN AN ORGANIZED HEALTH CARE EDUCATION/TRAINING PROGRAM

## 2025-03-24 PROCEDURE — 0QSH04Z REPOSITION LEFT TIBIA WITH INTERNAL FIXATION DEVICE, OPEN APPROACH: ICD-10-PCS | Performed by: ORTHOPAEDIC SURGERY

## 2025-03-24 PROCEDURE — 3600000016 HC SURGERY LEVEL 6 ADDTL 15MIN: Performed by: ORTHOPAEDIC SURGERY

## 2025-03-24 PROCEDURE — 6360000002 HC RX W HCPCS

## 2025-03-24 PROCEDURE — 64447 NJX AA&/STRD FEMORAL NRV IMG: CPT | Performed by: ANESTHESIOLOGY

## 2025-03-24 PROCEDURE — C1713 ANCHOR/SCREW BN/BN,TIS/BN: HCPCS | Performed by: ORTHOPAEDIC SURGERY

## 2025-03-24 PROCEDURE — 2709999900 HC NON-CHARGEABLE SUPPLY: Performed by: ORTHOPAEDIC SURGERY

## 2025-03-24 PROCEDURE — 80053 COMPREHEN METABOLIC PANEL: CPT

## 2025-03-24 DEVICE — IMPLANTABLE DEVICE: Type: IMPLANTABLE DEVICE | Site: ANKLE | Status: FUNCTIONAL

## 2025-03-24 DEVICE — SCREW BNE L14MM DIA3.5MM CORT S STL ST LOK FULL THRD: Type: IMPLANTABLE DEVICE | Site: ANKLE | Status: FUNCTIONAL

## 2025-03-24 DEVICE — SCREW BNE L12MM DIA2.7MM CORT S STL ST LOK FULL THRD T8: Type: IMPLANTABLE DEVICE | Site: ANKLE | Status: FUNCTIONAL

## 2025-03-24 DEVICE — SCREW BNE L10MM DIA2.7MM CORT S STL ST LOK FULL THRD T8: Type: IMPLANTABLE DEVICE | Site: ANKLE | Status: FUNCTIONAL

## 2025-03-24 DEVICE — SCREW BNE L12MM DIA3.5MM CORT S STL ST LOK FULL THRD: Type: IMPLANTABLE DEVICE | Site: ANKLE | Status: FUNCTIONAL

## 2025-03-24 DEVICE — SCREW BNE L16MM DIA3.5MM CORT S STL ST NONCANNULATED LOK: Type: IMPLANTABLE DEVICE | Site: ANKLE | Status: FUNCTIONAL

## 2025-03-24 DEVICE — SCREW BNE L12MM DIA3.5MM CORT S STL ST NONCANNULATED LOK: Type: IMPLANTABLE DEVICE | Site: ANKLE | Status: FUNCTIONAL

## 2025-03-24 DEVICE — SCREW BNE L18MM DIA2.7MM CORT S STL ST FULL THRD FOR SM: Type: IMPLANTABLE DEVICE | Site: ANKLE | Status: FUNCTIONAL

## 2025-03-24 DEVICE — SCREW BNE L14MM DIA2.7MM CORT S STL ST LOK FULL THRD T8: Type: IMPLANTABLE DEVICE | Site: ANKLE | Status: FUNCTIONAL

## 2025-03-24 RX ORDER — ONDANSETRON 2 MG/ML
4 INJECTION INTRAMUSCULAR; INTRAVENOUS
Status: DISCONTINUED | OUTPATIENT
Start: 2025-03-24 | End: 2025-03-24 | Stop reason: HOSPADM

## 2025-03-24 RX ORDER — HYDRALAZINE HYDROCHLORIDE 20 MG/ML
10 INJECTION INTRAMUSCULAR; INTRAVENOUS
Status: DISCONTINUED | OUTPATIENT
Start: 2025-03-24 | End: 2025-03-24 | Stop reason: HOSPADM

## 2025-03-24 RX ORDER — POLYETHYLENE GLYCOL 3350 17 G/17G
17 POWDER, FOR SOLUTION ORAL DAILY
Status: DISCONTINUED | OUTPATIENT
Start: 2025-03-24 | End: 2025-03-27 | Stop reason: HOSPADM

## 2025-03-24 RX ORDER — HYDROMORPHONE HYDROCHLORIDE 1 MG/ML
0.5 INJECTION, SOLUTION INTRAMUSCULAR; INTRAVENOUS; SUBCUTANEOUS
Status: DISCONTINUED | OUTPATIENT
Start: 2025-03-24 | End: 2025-03-27

## 2025-03-24 RX ORDER — LIDOCAINE HYDROCHLORIDE 20 MG/ML
INJECTION, SOLUTION EPIDURAL; INFILTRATION; INTRACAUDAL; PERINEURAL
Status: DISCONTINUED | OUTPATIENT
Start: 2025-03-24 | End: 2025-03-24 | Stop reason: SDUPTHER

## 2025-03-24 RX ORDER — KETOROLAC TROMETHAMINE 30 MG/ML
INJECTION, SOLUTION INTRAMUSCULAR; INTRAVENOUS
Status: DISCONTINUED | OUTPATIENT
Start: 2025-03-24 | End: 2025-03-24 | Stop reason: SDUPTHER

## 2025-03-24 RX ORDER — SODIUM CHLORIDE 0.9 % (FLUSH) 0.9 %
5-40 SYRINGE (ML) INJECTION EVERY 12 HOURS SCHEDULED
Status: DISCONTINUED | OUTPATIENT
Start: 2025-03-24 | End: 2025-03-27 | Stop reason: HOSPADM

## 2025-03-24 RX ORDER — ROPIVACAINE HYDROCHLORIDE 5 MG/ML
INJECTION, SOLUTION EPIDURAL; INFILTRATION; PERINEURAL
Status: COMPLETED
Start: 2025-03-24 | End: 2025-03-24

## 2025-03-24 RX ORDER — SODIUM CHLORIDE 0.9 % (FLUSH) 0.9 %
5-40 SYRINGE (ML) INJECTION EVERY 12 HOURS SCHEDULED
Status: DISCONTINUED | OUTPATIENT
Start: 2025-03-24 | End: 2025-03-24 | Stop reason: HOSPADM

## 2025-03-24 RX ORDER — CEFAZOLIN SODIUM 1 G/3ML
INJECTION, POWDER, FOR SOLUTION INTRAMUSCULAR; INTRAVENOUS
Status: DISCONTINUED | OUTPATIENT
Start: 2025-03-24 | End: 2025-03-24 | Stop reason: SDUPTHER

## 2025-03-24 RX ORDER — ACETAMINOPHEN 325 MG/1
650 TABLET ORAL
Status: DISCONTINUED | OUTPATIENT
Start: 2025-03-24 | End: 2025-03-24 | Stop reason: HOSPADM

## 2025-03-24 RX ORDER — ASPIRIN 325 MG
325 TABLET ORAL 2 TIMES DAILY
Status: DISCONTINUED | OUTPATIENT
Start: 2025-03-24 | End: 2025-03-27 | Stop reason: HOSPADM

## 2025-03-24 RX ORDER — SODIUM CHLORIDE 9 MG/ML
INJECTION, SOLUTION INTRAVENOUS PRN
Status: DISCONTINUED | OUTPATIENT
Start: 2025-03-24 | End: 2025-03-27 | Stop reason: HOSPADM

## 2025-03-24 RX ORDER — DEXAMETHASONE SODIUM PHOSPHATE 4 MG/ML
INJECTION, SOLUTION INTRA-ARTICULAR; INTRALESIONAL; INTRAMUSCULAR; INTRAVENOUS; SOFT TISSUE
Status: DISCONTINUED | OUTPATIENT
Start: 2025-03-24 | End: 2025-03-24 | Stop reason: SDUPTHER

## 2025-03-24 RX ORDER — ACETAMINOPHEN 325 MG/1
650 TABLET ORAL EVERY 6 HOURS
Status: DISCONTINUED | OUTPATIENT
Start: 2025-03-24 | End: 2025-03-27 | Stop reason: HOSPADM

## 2025-03-24 RX ORDER — NALOXONE HYDROCHLORIDE 0.4 MG/ML
INJECTION, SOLUTION INTRAMUSCULAR; INTRAVENOUS; SUBCUTANEOUS PRN
Status: DISCONTINUED | OUTPATIENT
Start: 2025-03-24 | End: 2025-03-24 | Stop reason: HOSPADM

## 2025-03-24 RX ORDER — PROCHLORPERAZINE EDISYLATE 5 MG/ML
5 INJECTION INTRAMUSCULAR; INTRAVENOUS
Status: DISCONTINUED | OUTPATIENT
Start: 2025-03-24 | End: 2025-03-24 | Stop reason: HOSPADM

## 2025-03-24 RX ORDER — OXYCODONE HYDROCHLORIDE 10 MG/1
10 TABLET ORAL EVERY 4 HOURS PRN
Status: DISCONTINUED | OUTPATIENT
Start: 2025-03-24 | End: 2025-03-27 | Stop reason: HOSPADM

## 2025-03-24 RX ORDER — ONDANSETRON 4 MG/1
4 TABLET, ORALLY DISINTEGRATING ORAL EVERY 8 HOURS PRN
Status: DISCONTINUED | OUTPATIENT
Start: 2025-03-24 | End: 2025-03-27 | Stop reason: HOSPADM

## 2025-03-24 RX ORDER — ROPIVACAINE HYDROCHLORIDE 5 MG/ML
INJECTION, SOLUTION EPIDURAL; INFILTRATION; PERINEURAL
Status: DISCONTINUED | OUTPATIENT
Start: 2025-03-24 | End: 2025-03-24 | Stop reason: SDUPTHER

## 2025-03-24 RX ORDER — FENTANYL CITRATE 50 UG/ML
25 INJECTION, SOLUTION INTRAMUSCULAR; INTRAVENOUS EVERY 5 MIN PRN
Status: DISCONTINUED | OUTPATIENT
Start: 2025-03-24 | End: 2025-03-24 | Stop reason: HOSPADM

## 2025-03-24 RX ORDER — ONDANSETRON 2 MG/ML
INJECTION INTRAMUSCULAR; INTRAVENOUS
Status: DISCONTINUED | OUTPATIENT
Start: 2025-03-24 | End: 2025-03-24 | Stop reason: SDUPTHER

## 2025-03-24 RX ORDER — ONDANSETRON 2 MG/ML
4 INJECTION INTRAMUSCULAR; INTRAVENOUS EVERY 6 HOURS PRN
Status: DISCONTINUED | OUTPATIENT
Start: 2025-03-24 | End: 2025-03-27 | Stop reason: HOSPADM

## 2025-03-24 RX ORDER — MIDAZOLAM HYDROCHLORIDE 1 MG/ML
INJECTION, SOLUTION INTRAMUSCULAR; INTRAVENOUS
Status: COMPLETED
Start: 2025-03-24 | End: 2025-03-24

## 2025-03-24 RX ORDER — SODIUM CHLORIDE 9 MG/ML
INJECTION, SOLUTION INTRAVENOUS PRN
Status: DISCONTINUED | OUTPATIENT
Start: 2025-03-24 | End: 2025-03-24 | Stop reason: HOSPADM

## 2025-03-24 RX ORDER — DEXTROSE MONOHYDRATE, SODIUM CHLORIDE, AND POTASSIUM CHLORIDE 50; 1.49; 4.5 G/1000ML; G/1000ML; G/1000ML
INJECTION, SOLUTION INTRAVENOUS CONTINUOUS
Status: DISCONTINUED | OUTPATIENT
Start: 2025-03-24 | End: 2025-03-27

## 2025-03-24 RX ORDER — FENTANYL CITRATE 50 UG/ML
INJECTION, SOLUTION INTRAMUSCULAR; INTRAVENOUS
Status: DISCONTINUED | OUTPATIENT
Start: 2025-03-24 | End: 2025-03-24 | Stop reason: SDUPTHER

## 2025-03-24 RX ORDER — SODIUM CHLORIDE 9 MG/ML
INJECTION, SOLUTION INTRAVENOUS
Status: DISCONTINUED | OUTPATIENT
Start: 2025-03-24 | End: 2025-03-24 | Stop reason: SDUPTHER

## 2025-03-24 RX ORDER — HYDROMORPHONE HYDROCHLORIDE 1 MG/ML
0.25 INJECTION, SOLUTION INTRAMUSCULAR; INTRAVENOUS; SUBCUTANEOUS
Status: DISCONTINUED | OUTPATIENT
Start: 2025-03-24 | End: 2025-03-27

## 2025-03-24 RX ORDER — PROPOFOL 10 MG/ML
INJECTION, EMULSION INTRAVENOUS
Status: DISCONTINUED | OUTPATIENT
Start: 2025-03-24 | End: 2025-03-24 | Stop reason: SDUPTHER

## 2025-03-24 RX ORDER — OXYCODONE HYDROCHLORIDE 5 MG/1
5 TABLET ORAL EVERY 4 HOURS PRN
Status: DISCONTINUED | OUTPATIENT
Start: 2025-03-24 | End: 2025-03-27 | Stop reason: HOSPADM

## 2025-03-24 RX ORDER — DEXAMETHASONE SODIUM PHOSPHATE 10 MG/ML
INJECTION, SOLUTION INTRAMUSCULAR; INTRAVENOUS
Status: DISPENSED
Start: 2025-03-24 | End: 2025-03-25

## 2025-03-24 RX ORDER — SODIUM CHLORIDE 0.9 % (FLUSH) 0.9 %
5-40 SYRINGE (ML) INJECTION PRN
Status: DISCONTINUED | OUTPATIENT
Start: 2025-03-24 | End: 2025-03-24 | Stop reason: HOSPADM

## 2025-03-24 RX ORDER — SODIUM CHLORIDE 0.9 % (FLUSH) 0.9 %
5-40 SYRINGE (ML) INJECTION PRN
Status: DISCONTINUED | OUTPATIENT
Start: 2025-03-24 | End: 2025-03-27 | Stop reason: HOSPADM

## 2025-03-24 RX ORDER — MIDAZOLAM HYDROCHLORIDE 1 MG/ML
INJECTION, SOLUTION INTRAMUSCULAR; INTRAVENOUS
Status: DISCONTINUED | OUTPATIENT
Start: 2025-03-24 | End: 2025-03-24 | Stop reason: SDUPTHER

## 2025-03-24 RX ADMIN — SODIUM CHLORIDE: 9 INJECTION, SOLUTION INTRAVENOUS at 14:35

## 2025-03-24 RX ADMIN — DEXAMETHASONE SODIUM PHOSPHATE 8 MG: 4 INJECTION, SOLUTION INTRAMUSCULAR; INTRAVENOUS at 14:51

## 2025-03-24 RX ADMIN — PROPOFOL 120 MG: 10 INJECTION, EMULSION INTRAVENOUS at 14:44

## 2025-03-24 RX ADMIN — ONDANSETRON 4 MG: 2 INJECTION INTRAMUSCULAR; INTRAVENOUS at 14:51

## 2025-03-24 RX ADMIN — LIDOCAINE HYDROCHLORIDE 100 MG: 20 INJECTION, SOLUTION EPIDURAL; INFILTRATION; INTRACAUDAL; PERINEURAL at 14:44

## 2025-03-24 RX ADMIN — CEFAZOLIN 2 G: 1 INJECTION, POWDER, FOR SOLUTION INTRAMUSCULAR; INTRAVENOUS; PARENTERAL at 14:50

## 2025-03-24 RX ADMIN — ROPIVACAINE HYDROCHLORIDE 15 ML: 5 INJECTION, SOLUTION EPIDURAL; INFILTRATION; PERINEURAL at 16:54

## 2025-03-24 RX ADMIN — HYDROMORPHONE HYDROCHLORIDE 0.5 MG: 1 INJECTION, SOLUTION INTRAMUSCULAR; INTRAVENOUS; SUBCUTANEOUS at 16:19

## 2025-03-24 RX ADMIN — CALCIUM 500 MG: 500 TABLET ORAL at 09:35

## 2025-03-24 RX ADMIN — ROPIVACAINE HYDROCHLORIDE 15 ML: 5 INJECTION, SOLUTION EPIDURAL; INFILTRATION; PERINEURAL at 16:56

## 2025-03-24 RX ADMIN — FENTANYL CITRATE 50 MCG: 50 INJECTION, SOLUTION INTRAMUSCULAR; INTRAVENOUS at 15:00

## 2025-03-24 RX ADMIN — DOXYCYCLINE HYCLATE 100 MG: 100 TABLET, FILM COATED ORAL at 09:35

## 2025-03-24 RX ADMIN — CETIRIZINE HYDROCHLORIDE 10 MG: 10 TABLET, FILM COATED ORAL at 22:56

## 2025-03-24 RX ADMIN — KETOROLAC TROMETHAMINE 30 MG: 30 INJECTION, SOLUTION INTRAMUSCULAR; INTRAVENOUS at 16:20

## 2025-03-24 RX ADMIN — Medication 1 TABLET: at 18:07

## 2025-03-24 RX ADMIN — ACETAMINOPHEN 650 MG: 325 TABLET ORAL at 23:49

## 2025-03-24 RX ADMIN — SODIUM CHLORIDE, PRESERVATIVE FREE 10 ML: 5 INJECTION INTRAVENOUS at 09:37

## 2025-03-24 RX ADMIN — WATER 2000 MG: 1 INJECTION INTRAMUSCULAR; INTRAVENOUS; SUBCUTANEOUS at 23:47

## 2025-03-24 RX ADMIN — MIDAZOLAM 2 MG: 1 INJECTION INTRAMUSCULAR; INTRAVENOUS at 14:35

## 2025-03-24 RX ADMIN — ACETAMINOPHEN 650 MG: 325 TABLET ORAL at 09:48

## 2025-03-24 RX ADMIN — FLUOXETINE HYDROCHLORIDE 40 MG: 20 CAPSULE ORAL at 09:36

## 2025-03-24 RX ADMIN — POTASSIUM CHLORIDE, DEXTROSE MONOHYDRATE AND SODIUM CHLORIDE: 150; 5; 450 INJECTION, SOLUTION INTRAVENOUS at 18:07

## 2025-03-24 RX ADMIN — FENTANYL CITRATE 50 MCG: 50 INJECTION, SOLUTION INTRAMUSCULAR; INTRAVENOUS at 15:09

## 2025-03-24 RX ADMIN — ACETAMINOPHEN 650 MG: 325 TABLET ORAL at 18:07

## 2025-03-24 ASSESSMENT — PAIN - FUNCTIONAL ASSESSMENT: PAIN_FUNCTIONAL_ASSESSMENT: PREVENTS OR INTERFERES SOME ACTIVE ACTIVITIES AND ADLS

## 2025-03-24 ASSESSMENT — PAIN DESCRIPTION - FREQUENCY: FREQUENCY: CONTINUOUS

## 2025-03-24 ASSESSMENT — PAIN DESCRIPTION - LOCATION
LOCATION: ANKLE

## 2025-03-24 ASSESSMENT — PAIN DESCRIPTION - DESCRIPTORS: DESCRIPTORS: ACHING;DISCOMFORT;TENDER

## 2025-03-24 ASSESSMENT — PAIN SCALES - GENERAL
PAINLEVEL_OUTOF10: 4
PAINLEVEL_OUTOF10: 4
PAINLEVEL_OUTOF10: 3
PAINLEVEL_OUTOF10: 0

## 2025-03-24 ASSESSMENT — PAIN DESCRIPTION - ORIENTATION: ORIENTATION: LEFT

## 2025-03-24 ASSESSMENT — LIFESTYLE VARIABLES: SMOKING_STATUS: 0

## 2025-03-24 ASSESSMENT — PAIN DESCRIPTION - ONSET: ONSET: ON-GOING

## 2025-03-24 ASSESSMENT — PAIN DESCRIPTION - PAIN TYPE: TYPE: ACUTE PAIN

## 2025-03-24 NOTE — ANESTHESIA PROCEDURE NOTES
Peripheral Block    Patient location during procedure: post-op  Reason for block: post-op pain management  Start time: 3/24/2025 4:54 PM  End time: 3/24/2025 4:54 PM  Staffing  Performed: anesthesiologist   Anesthesiologist: Karan Birmingham MD  Resident/CRNA: Rafiq Lpoez APRN - CRNA  Performed by: Karan Birmingham MD  Authorized by: Karan Birmingham MD    Preanesthetic Checklist  Completed: patient identified, IV checked, site marked, risks and benefits discussed, surgical/procedural consents, equipment checked, pre-op evaluation, timeout performed, anesthesia consent given, oxygen available, monitors applied/VS acknowledged, fire risk safety assessment completed and verbalized and blood product R/B/A discussed and consented  Peripheral Block   Patient position: supine  Prep: ChloraPrep  Provider prep: sterile gloves and mask  Patient monitoring: cardiac monitor, continuous pulse ox, frequent blood pressure checks, IV access and oxygen  Block type: Femoral and Sciatic  Laterality: left  Injection technique: single-shot  Guidance: ultrasound guided    Needle   Needle type: pencil-tip   Needle gauge: 20 G  Needle localization: ultrasound guidance  Needle length: 10 cm  Assessment   Injection assessment: negative aspiration for heme, local visualized surrounding nerve on ultrasound and no intravascular symptoms  Slow fractionated injection: yes  Hemodynamics: stable  Outcomes: uncomplicated    Medications Administered  ropivacaine (NAROPIN) injection 0.5% - Perineural   15 mL - 3/24/2025 4:54:00 PM   15 mL - 3/24/2025 4:56:00 PM

## 2025-03-24 NOTE — OP NOTE
[address] [place] [address]                            OPERATIVE REPORT      PATIENT NAME: JOSHUA RICHARD                  : 1975  MED REC NO: [id number]                      ROOM: OR  ACCOUNT NO: [id number]                       ADMIT DATE: 2025  PROVIDER: Glenn Taylor MD      DATE OF PROCEDURE:  2025    SURGEON:  Glenn Taylor MD    PREOPERATIVE DIAGNOSIS:  Left ankle fracture dislocation (bimalleolar ankle fracture).    PROCEDURE PERFORMED:  Open reduction internal fixation, left lateral malleolar ankle fracture.    ASSISTANTS:  None.    ANESTHESIA:  General endotracheal.    ESTIMATED BLOOD LOSS:  Minimal.    DRAINS:  None.    SPECIMENS:  None.    COMPLICATIONS:  None.    INDICATIONS FOR PROCEDURE:  The patient is a 50-year-old female who fell injuring her left ankle resulting in an ankle fracture dislocation, which was reduced in the emergency room.  She was admitted to the hospital for definitive fixation.  I discussed the case with the patient and her mom regarding the nature of the fracture and the need for ORIF.  She was explained that if there is soft tissue compromise, it may require an external fixator or staged procedure depending on intraoperative findings.  The risks and benefits of surgical interventions were outlined in the consultation.    DESCRIPTION OF PROCEDURE:  The patient was brought back to the operating room, placed supine on the operating table.  Once under general endotracheal anesthesia, a tourniquet was placed around the left proximal thigh.  Left lower extremity was then prepped and draped in usual manner.  Time-out was then performed confirming the patient's name, operative site, proposed procedure, known allergies, and administration of antibiotics.  Upon examining the medial soft tissue, there was an area of dusky blistery area over the proposed incision site to repair the

## 2025-03-24 NOTE — CONSULTS
Patient Name: Bronwyn Cunningham   (1975)  MRN      [id number]   Today's date:  3/24/2025    CHIEF COMPLAINT:  Left Ankle pain        HISTORY OF PRESENT ILLNESS:      The patient is a 50 y.o. female  who presents to the ER after a fall.  X-rays were done in the ER showing a displaced Left ankle fracture dislocation.    She was walking to [employer] when she fell    Fracture was reduced in the ER     The patient denied any chest pain, shortness of breath or syncopal episode prior to the fall.        Past Medical History         Diagnosis Date    Cerebellar hypoplasia (HCC)     Closed fracture of 4th metacarpal 01/24/2014    Closed fracture of left elbow with routine healing 05/02/2023    DAWN Ramirez, [facility]    Depression     Dr. Hidalgo     Developmental delay     Environmental allergies     Finger pain, left- 5th digit MCP 01/20/2014    Fracture of lumbar vertebra (HCC) 2006    Pedicle fracture L4    Headache(784.0)     neuro-opthamaology [facility] Dr. Joycelyn ORTIZ (hyperlipidemia)     diet controlling    Knee pain 11/16/2012    Left knee pain 10/02/2012    Unsteady gait     h/o cerebellar hypoplasia    Uses walker 02/10/2020    Weight gain        Past Surgical History         Procedure Laterality Date    EYE SURGERY      FRACTURE SURGERY  2006    back, ( back brace ) L4 pedicle at [employer] working out       Medications Prior to Admission:     Prior to Admission medications    Medication Sig Start Date End Date Taking? Authorizing Provider   doxycycline hyclate (VIBRA-TABS) 100 MG tablet Take 1 tablet by mouth daily 9/16/24   Awa Figueroa MD   FLUoxetine (PROZAC) 40 MG capsule TAKE 1 CAPSULE BY MOUTH EVERY DAY 9/16/24   Awa Figueroa MD   cetirizine (ZYRTEC) 10 MG tablet Take 1 tablet by mouth nightly 11/28/17   Awa Figueroa MD   ibuprofen (ADVIL;MOTRIN) 200 MG tablet Take 1 tablet by mouth every 6 hours as needed for Pain

## 2025-03-24 NOTE — PROGRESS NOTES
4 Eyes Skin Assessment     NAME:  Bronwyn Cunningham  YOB: 1975  MEDICAL RECORD NUMBER:  [id number]    The patient is being assessed for  Admission    I agree that at least one RN has performed a thorough Head to Toe Skin Assessment on the patient. ALL assessment sites listed below have been assessed.      Areas assessed by both nurses:    Head, Face, Ears, Shoulders, Back, Chest, Arms, Elbows, Hands, Sacrum. Buttock, Coccyx, Ischium, and Legs. Feet and Heels        Does the Patient have a Wound? No noted wound(s)       Carlitos Prevention initiated by RN: No  Wound Care Orders initiated by RN: No    Pressure Injury (Stage 3,4, Unstageable, DTI, NWPT, and Complex wounds) if present, place Wound referral order by RN under : No    New Ostomies, if present place, Ostomy referral order under : No     Nurse 1 eSignature: Electronically signed by Parker Bautista RN on 3/23/25 at 8:04 PM EDT    **SHARE this note so that the co-signing nurse can place an eSignature**    Nurse 2 eSignature: Electronically signed by Lana Clements RN on 3/24/25 at 1:00 AM EDT

## 2025-03-24 NOTE — PROGRESS NOTES
V2.0  [facility] Hospitalist Progress Note      Name:  Bronwyn Cunningham /Age/Sex: 1975  (50 y.o. female)   MRN & CSN:  [id number] & [id number] Encounter Date/Time: 3/24/2025 9:38 AM EDT    Location:  [address] [place] [address] PCP: Awa Figueroa MD       Hospital Day: 2    Assessment and Plan:   Bronwyn Cunningham is a 50 y.o. female with pmh as noted below who presents with Closed left ankle fracture, initial encounter      Plan:  Left ankle fracture  H/O ambulatory dysfunction  Patient presents post fall with pain in left ankle, found on imaging to have trimalleolar fracture  --ED physician reduced the fracture and splinted it  --Follow up on ortho consult, bedrest until weight bearing status is confirmed by ortho  --PT/OT consulted pending weight bearing status  --Analgesics as needed  --3/25 Plan for surgery today with Dr. Taylor.      H/O Cerebellar hypoplasia  --Stable  --Continue home regimen    Diet ADULT DIET; Regular; 5 carb choices (75 gm/meal); Low Fat/Low Chol/High Fiber/MELVA   DVT Prophylaxis [] Lovenox, []  Heparin, [] SCDs, [] Ambulation,  [] Eliquis, [] Xarelto  [] Coumadin   Code Status Full Code   Disposition From: Home  Expected Disposition: Home  Estimated Date of Discharge: TBD  Patient requires continued admission due to left ankle fracture repair   Surrogate Decision Maker/ POA Jeaneth-mother-POA     Subjective:     Chief Complaint: Closed left ankle fracture, initial encounter     Bronwyn Cunningham is a 50 y.o. female who presents with Closed left ankle fracture, initial encounter  Patient seen and examined at bedside, discussed plan of care with patient's mother who is also at bedside.  Patient to go for surgery today for ankle fracture.  Chart reviewed, patient discussed in IDR.         Review of Systems:    Pertinent neg/pos noted above        Objective:     Intake/Output Summary (Last 24 hours) at 3/24/2025 09  Last data filed at 3/24/2025 0913  Gross per 24 hour   Intake 300 ml   Output 1 ml

## 2025-03-24 NOTE — ANESTHESIA PRE PROCEDURE
Department of Anesthesiology  Preprocedure Note       Name:  Bronwyn Cunningham   Age:  50 y.o.  :  1975                                          MRN:  [id number]         Date:  3/24/2025      Surgeon: Surgeon(s):  Glenn Taylor MD    Procedure: Procedure(s):  LEFT ANKLE OPEN REDUCTION INTERNAL FIXATION    Medications prior to admission:   Prior to Admission medications    Medication Sig Start Date End Date Taking? Authorizing Provider   doxycycline hyclate (VIBRA-TABS) 100 MG tablet Take 1 tablet by mouth daily 24   Awa Figueroa MD   FLUoxetine (PROZAC) 40 MG capsule TAKE 1 CAPSULE BY MOUTH EVERY DAY 24   Awa Figueroa MD   cetirizine (ZYRTEC) 10 MG tablet Take 1 tablet by mouth nightly 17   Awa Figueroa MD   ibuprofen (ADVIL;MOTRIN) 200 MG tablet Take 1 tablet by mouth every 6 hours as needed for Pain    ProviderDimitrios MD   fluticasone (FLONASE) 50 MCG/ACT nasal spray 2 sprays by Nasal route daily as needed for Rhinitis 16   Awa Figueroa MD   ipratropium (ATROVENT) 0.06 % nasal spray  14   ProviderDimitrios MD   Pediatric Multivitamins-Fl (MULTI VIT/FL PO) Take  by mouth.    ProviderDimitrios MD   calcium carbonate (OSCAL) 500 MG TABS tablet Take 1 tablet by mouth daily    ProviderDimitrios MD       Current medications:    Current Facility-Administered Medications   Medication Dose Route Frequency Provider Last Rate Last Admin   • ROPivacaine (NAROPIN) 0.5% injection            • dexAMETHasone (PF) (DECADRON) 10 MG/ML injection            • midazolam (VERSED) 2 MG/2ML injection            • sodium chloride flush 0.9 % injection 5-40 mL  5-40 mL IntraVENous 2 times per day Caleb Sanchez MD   10 mL at 25 0937   • sodium chloride flush 0.9 % injection 5-40 mL  5-40 mL IntraVENous PRN Caleb Sanchez MD       • 0.9 % sodium chloride infusion   IntraVENous PRN 
\"COVID19\"        Anesthesia Evaluation  Patient summary reviewed  Airway:           Dental:          Pulmonary:       (-) not a current smoker                           Cardiovascular:                      Neuro/Psych:   (+) headaches:, psychiatric history:             ROS comment: Hx of cerebellar hypoplasia    Development delay    2012 MRI head  FINDINGS:   Magnetic resonance angiography of the head utilized 3-D time-of-flight sequencing with the   maximum intensity projection and presentation of images in 180 degrees in multiple planes. 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  The A1 segment on the right is fairly prominent in size.  There is a fairly   prominent posterior communicating artery on the left.  No evidence of aneurysm or vascular   malformation is identified.     IMPRESSION:   No significant findings.        GI/Hepatic/Renal:   (+) morbid obesity          Endo/Other:                     Abdominal:             Vascular:          Other Findings:             Anesthesia Plan      general and regional     ASA 3     (Preop chartreview only. )  Induction: intravenous.                            SHANELL Todd - CRNA   3/24/2025

## 2025-03-24 NOTE — PROGRESS NOTES
I did NOT see the patient. The patient was discussed with the GENTRY. I was available for questions and consultation as needed.       Getting surgery.

## 2025-03-24 NOTE — BRIEF OP NOTE
OPERATIVE NOTE    Patient Name:   Bronwyn Cunningham (1975)  Date of Surgery  3/24/2025      Preoperative Diagnosis: Left Bimalleolar Ankle Fracture    Postoperative Diagnosis: Same    Procedure Performed: ORIF Left Lateral Malleolar                                  Surgeon:   Glenn Hyde MD    Assistant:    None    Anesthesia:    General endotrachial anesthesia    Estimated blood loss:   Less than 50 ml    Specimens:   None    Complications:    None

## 2025-03-24 NOTE — PROGRESS NOTES
1645- pt received from OR. Monitors placed and alarms on. Report received from Isauro RENEE. Oral airway in place upon arrival to PACU.  1647- Dr Birmingham at bedside to due nerve block.  1655- oral airway removed.  1712- pt resting comfortably. Denies any pain or nausea.  1722- report called to BRYANT Manning nurse prior to transport to inpatient room.  1728- pt transported to inpatient room.

## 2025-03-24 NOTE — ANESTHESIA POSTPROCEDURE EVALUATION
Department of Anesthesiology  Postprocedure Note    Patient: Bronwyn Cunningham  MRN: [id number]  YOB: 1975  Date of evaluation: 3/24/2025    Procedure Summary       Date: 03/24/25 Room / Location: [address]    Anesthesia Start: 1435 Anesthesia Stop: 1656    Procedure: LEFT ANKLE OPEN REDUCTION INTERNAL FIXATION (Left: Ankle) Diagnosis:       Closed fracture of left ankle, initial encounter      (Closed fracture of left ankle, initial encounter [S82.892A])    Surgeons: Glenn Taylor MD Responsible Provider: Karan Birmingham MD    Anesthesia Type: General, Regional ASA Status: 3            Anesthesia Type: General, Regional    Aquilino Phase I: Aquilino Score: 4    Aquilino Phase II:      Anesthesia Post Evaluation    Patient location during evaluation: PACU  Patient participation: complete - patient participated  Level of consciousness: sleepy but conscious  Airway patency: patent  Nausea & Vomiting: no nausea and no vomiting  Cardiovascular status: hemodynamically stable  Respiratory status: acceptable, oral airway, spontaneous ventilation and face mask  Hydration status: stable  Pain management: adequate    No notable events documented.

## 2025-03-25 ENCOUNTER — ANESTHESIA (OUTPATIENT)
Dept: OPERATING ROOM | Age: 50
DRG: 492 | End: 2025-03-25
Payer: MEDICARE

## 2025-03-25 LAB
ALBUMIN SERPL-MCNC: 3.3 G/DL (ref 3.4–5)
ALBUMIN/GLOB SERPL: 1.5 {RATIO} (ref 1.1–2.2)
ALP SERPL-CCNC: 72 U/L (ref 40–129)
ALT SERPL-CCNC: 8 U/L (ref 10–40)
ANION GAP SERPL CALCULATED.3IONS-SCNC: 7 MMOL/L (ref 9–17)
AST SERPL-CCNC: 16 U/L (ref 15–37)
BASOPHILS # BLD: 0.01 K/UL
BASOPHILS NFR BLD: 0 % (ref 0–1)
BILIRUB SERPL-MCNC: <0.2 MG/DL (ref 0–1)
BUN SERPL-MCNC: 15 MG/DL (ref 7–20)
CALCIUM SERPL-MCNC: 8.6 MG/DL (ref 8.3–10.6)
CHLORIDE SERPL-SCNC: 105 MMOL/L (ref 99–110)
CO2 SERPL-SCNC: 24 MMOL/L (ref 21–32)
CREAT SERPL-MCNC: 0.7 MG/DL (ref 0.6–1.1)
EOSINOPHIL # BLD: 0 K/UL
EOSINOPHILS RELATIVE PERCENT: 0 % (ref 0–3)
ERYTHROCYTE [DISTWIDTH] IN BLOOD BY AUTOMATED COUNT: 15.3 % (ref 11.7–14.9)
GFR, ESTIMATED: >90 ML/MIN/1.73M2
GLUCOSE SERPL-MCNC: 220 MG/DL (ref 74–99)
HCT VFR BLD AUTO: 35.1 % (ref 37–47)
HGB BLD-MCNC: 11.5 G/DL (ref 12.5–16)
IMM GRANULOCYTES # BLD AUTO: 0.16 K/UL
IMM GRANULOCYTES NFR BLD: 1 %
LYMPHOCYTES NFR BLD: 1.49 K/UL
LYMPHOCYTES RELATIVE PERCENT: 11 % (ref 24–44)
MCH RBC QN AUTO: 27.6 PG (ref 27–31)
MCHC RBC AUTO-ENTMCNC: 32.8 G/DL (ref 32–36)
MCV RBC AUTO: 84.2 FL (ref 78–100)
MONOCYTES NFR BLD: 0.94 K/UL
MONOCYTES NFR BLD: 7 % (ref 0–4)
NEUTROPHILS NFR BLD: 80 % (ref 36–66)
NEUTS SEG NFR BLD: 10.49 K/UL
PLATELET # BLD AUTO: 214 K/UL (ref 140–440)
PMV BLD AUTO: 11.4 FL (ref 7.5–11.1)
POTASSIUM SERPL-SCNC: 3.9 MMOL/L (ref 3.5–5.1)
PROT SERPL-MCNC: 5.4 G/DL (ref 6.4–8.2)
RBC # BLD AUTO: 4.17 M/UL (ref 4.2–5.4)
SODIUM SERPL-SCNC: 135 MMOL/L (ref 136–145)
WBC OTHER # BLD: 13.1 K/UL (ref 4–10.5)

## 2025-03-25 PROCEDURE — 36415 COLL VENOUS BLD VENIPUNCTURE: CPT

## 2025-03-25 PROCEDURE — 6370000000 HC RX 637 (ALT 250 FOR IP): Performed by: ORTHOPAEDIC SURGERY

## 2025-03-25 PROCEDURE — 97530 THERAPEUTIC ACTIVITIES: CPT

## 2025-03-25 PROCEDURE — 97166 OT EVAL MOD COMPLEX 45 MIN: CPT

## 2025-03-25 PROCEDURE — 2700000000 HC OXYGEN THERAPY PER DAY

## 2025-03-25 PROCEDURE — 85025 COMPLETE CBC W/AUTO DIFF WBC: CPT

## 2025-03-25 PROCEDURE — 80053 COMPREHEN METABOLIC PANEL: CPT

## 2025-03-25 PROCEDURE — 94761 N-INVAS EAR/PLS OXIMETRY MLT: CPT

## 2025-03-25 PROCEDURE — 6360000002 HC RX W HCPCS: Performed by: ORTHOPAEDIC SURGERY

## 2025-03-25 PROCEDURE — 2500000003 HC RX 250 WO HCPCS: Performed by: ORTHOPAEDIC SURGERY

## 2025-03-25 PROCEDURE — 1200000000 HC SEMI PRIVATE

## 2025-03-25 PROCEDURE — 51798 US URINE CAPACITY MEASURE: CPT

## 2025-03-25 PROCEDURE — 97162 PT EVAL MOD COMPLEX 30 MIN: CPT

## 2025-03-25 PROCEDURE — 97535 SELF CARE MNGMENT TRAINING: CPT

## 2025-03-25 RX ADMIN — POTASSIUM CHLORIDE, DEXTROSE MONOHYDRATE AND SODIUM CHLORIDE: 150; 5; 450 INJECTION, SOLUTION INTRAVENOUS at 17:13

## 2025-03-25 RX ADMIN — Medication 1 TABLET: at 17:08

## 2025-03-25 RX ADMIN — ENOXAPARIN SODIUM 40 MG: 100 INJECTION SUBCUTANEOUS at 09:38

## 2025-03-25 RX ADMIN — POLYETHYLENE GLYCOL (3350) 17 G: 17 POWDER, FOR SOLUTION ORAL at 09:41

## 2025-03-25 RX ADMIN — WATER 2000 MG: 1 INJECTION INTRAMUSCULAR; INTRAVENOUS; SUBCUTANEOUS at 09:42

## 2025-03-25 RX ADMIN — DOXYCYCLINE HYCLATE 100 MG: 100 TABLET, FILM COATED ORAL at 09:36

## 2025-03-25 RX ADMIN — CALCIUM 500 MG: 500 TABLET ORAL at 09:36

## 2025-03-25 RX ADMIN — CETIRIZINE HYDROCHLORIDE 10 MG: 10 TABLET, FILM COATED ORAL at 21:47

## 2025-03-25 RX ADMIN — ACETAMINOPHEN 650 MG: 325 TABLET ORAL at 13:01

## 2025-03-25 RX ADMIN — FLUOXETINE HYDROCHLORIDE 40 MG: 20 CAPSULE ORAL at 09:36

## 2025-03-25 RX ADMIN — SODIUM CHLORIDE, PRESERVATIVE FREE 10 ML: 5 INJECTION INTRAVENOUS at 09:42

## 2025-03-25 RX ADMIN — SODIUM CHLORIDE, PRESERVATIVE FREE 10 ML: 5 INJECTION INTRAVENOUS at 21:48

## 2025-03-25 RX ADMIN — ACETAMINOPHEN 650 MG: 325 TABLET ORAL at 06:43

## 2025-03-25 RX ADMIN — POTASSIUM CHLORIDE, DEXTROSE MONOHYDRATE AND SODIUM CHLORIDE: 150; 5; 450 INJECTION, SOLUTION INTRAVENOUS at 04:43

## 2025-03-25 ASSESSMENT — PAIN DESCRIPTION - LOCATION
LOCATION: LEG
LOCATION: ANKLE

## 2025-03-25 ASSESSMENT — PAIN DESCRIPTION - ORIENTATION
ORIENTATION: LEFT
ORIENTATION: LEFT

## 2025-03-25 ASSESSMENT — PAIN DESCRIPTION - DESCRIPTORS
DESCRIPTORS: DISCOMFORT
DESCRIPTORS: DISCOMFORT

## 2025-03-25 ASSESSMENT — PAIN - FUNCTIONAL ASSESSMENT: PAIN_FUNCTIONAL_ASSESSMENT: ACTIVITIES ARE NOT PREVENTED

## 2025-03-25 ASSESSMENT — PAIN SCALES - GENERAL
PAINLEVEL_OUTOF10: 3
PAINLEVEL_OUTOF10: 1

## 2025-03-25 NOTE — PROGRESS NOTES
Intake 520 ml   Output 20 ml   Net 500 ml        Vitals:   Vitals:    03/25/25 1015   BP: (!) 199/69   Pulse: 90   Resp: 14   Temp: 97.9 °F (36.6 °C)   SpO2: 99%       Physical Exam:     Physical Exam  Vitals and nursing note reviewed.   Constitutional:       General: She is not in acute distress.     Appearance: She is not ill-appearing.   HENT:      Mouth/Throat:      Mouth: Mucous membranes are moist.   Eyes:      Extraocular Movements: Extraocular movements intact.   Cardiovascular:      Rate and Rhythm: Normal rate and regular rhythm.   Pulmonary:      Effort: No respiratory distress.      Breath sounds: No wheezing or rhonchi.   Abdominal:      General: Bowel sounds are normal.      Palpations: Abdomen is soft.   Musculoskeletal:         General: Swelling, tenderness and signs of injury present.      Cervical back: Normal range of motion.   Skin:     General: Skin is warm and dry.   Neurological:      Mental Status: She is alert. Mental status is at baseline.   Psychiatric:         Mood and Affect: Mood normal.            Medications:   Medications:    sodium chloride flush  5-40 mL IntraVENous 2 times per day    [Held by provider] aspirin  325 mg Oral BID    acetaminophen  650 mg Oral Q6H    polyethylene glycol  17 g Oral Daily    sodium chloride flush  5-40 mL IntraVENous 2 times per day    enoxaparin  40 mg SubCUTAneous Daily    calcium elemental  500 mg Oral Daily    cetirizine  10 mg Oral Nightly    doxycycline hyclate  100 mg Oral Daily    FLUoxetine  40 mg Oral Daily    multivitamin  1 tablet Oral QPM      Infusions:    dextrose 5% and 0.45% NaCl with KCl 20 mEq 100 mL/hr at 03/25/25 0443    sodium chloride      sodium chloride       PRN Meds: sodium chloride flush, 5-40 mL, PRN  sodium chloride, , PRN  HYDROmorphone, 0.25 mg, Q3H PRN   Or  HYDROmorphone, 0.5 mg, Q3H PRN  oxyCODONE, 5 mg, Q4H PRN   Or  oxyCODONE, 10 mg, Q4H PRN  ondansetron, 4 mg, Q8H PRN   Or  ondansetron, 4 mg, Q6H PRN  sodium  chloride flush, 5-40 mL, PRN  sodium chloride, , PRN  potassium chloride, 40 mEq, PRN   Or  potassium alternative oral replacement, 40 mEq, PRN   Or  potassium chloride, 10 mEq, PRN  magnesium sulfate, 2,000 mg, PRN  polyethylene glycol, 17 g, Daily PRN  acetaminophen, 650 mg, Q6H PRN        Labs      No results found for this or any previous visit (from the past 24 hours).       Imaging/Diagnostics Last 24 Hours   XR ANKLE LEFT (2 VIEWS)  Result Date: 3/23/2025  PROCEDURE: XR ANKLE LEFT (2 VIEWS) DATE OF EXAM:  3/23/2025 14:45 DEMOGRAPHICS: 50 years old Female INDICATION: post reduction COMPARISON: X-ray from earlier in the day. FINDINGS: 2 views of the Left ankle were obtained. Examination in an immobilized in contrast. Trimalleolar fracture dislocation is suggested here. Reduction is incomplete. IMPRESSION:  1.  Post immobilization cast film. This dictation was created with voice recognition software.  While attempts have  been made to review the dictation as it is transcribed, on occasion the spoken word can be misinterpreted by the technology leading to omissions or inappropriate words, phrases or sentences.  Dictated and Electronically Signed By: Satya Wells MD 3/23/2025 15:15        XR ANKLE LEFT (MIN 3 VIEWS)  Result Date: 3/23/2025  PROCEDURE: XR ANKLE LEFT (MIN 3 VIEWS) DATE OF EXAM:  3/23/2025 13:38 DEMOGRAPHICS: 50 years old Female INDICATION: fall, ankle dislocation, ?fracture too COMPARISON: No existing relevant imaging study corresponding to the same anatomical region is available. FINDINGS: 3 views of the Left ankle were obtained. There is a fracture of the medial and lateral malleolus and suspected posterior malleolar fracture as well. There is suggested widening of the medial ankle joint mortise. There is significant artifact from overlying structures. Moderate soft tissue swelling. IMPRESSION:  1.  Fractures of medial and lateral malleoli possibly of the posterior malleolus  as well.

## 2025-03-25 NOTE — PROGRESS NOTES
Occupational Therapy  [facility] ACUTE CARE OCCUPATIONAL THERAPY EVALUATION  Bronwyn Cunningham, 1975, 1112/1112-A, 3/25/2025    Discharge Recommendation: Facility for moderate post-acute rehabilitation, anticipate 1-2 hours per day and 5 days per week.    History  [place]:  The encounter diagnosis was Closed fracture dislocation of left ankle, initial encounter.  Patient  has a past medical history of Cerebellar hypoplasia (HCC), Closed fracture of 4th metacarpal, Closed fracture of left elbow with routine healing, Depression, Developmental delay, Environmental allergies, Finger pain, left- 5th digit MCP, Fracture of lumbar vertebra (HCC), Headache(784.0), HLD (hyperlipidemia), Knee pain, Left knee pain, Unsteady gait, Uses walker, and Weight gain.  Patient  has a past surgical history that includes eye surgery; fracture surgery (2006); and Ankle fracture surgery (Left, 3/24/2025).    Subjective:  Patient comments:  \"I love my cat, she keeps me company\".    Pain:  Did not rate however reported pain in LLE.    Communication with other providers: Nurse starr duffy, co-eval with PT TERRENCE Orosco.   Restrictions: General Precautions, Fall Risk, NWB LLE    Home Setup/Prior level of function  Social/Functional History  Lives With: Alone  Type of Home: Apartment  Home Layout: One level  Home Access: Level entry  Bathroom Shower/Tub: Walk-in shower  Bathroom Equipment: Built-in shower seat, Grab bars in shower  Home Equipment: Rollator  Has the patient had two or more falls in the past year or any fall with injury in the past year?: Yes  Receives Help From: Personal care attendant  Prior Level of Assist for ADLs: Needs assistance  Prior Level of Assist for Homemaking: Needs assistance  Homemaking Responsibilities: Yes  Prior Level of Assist for Ambulation: Independent household ambulator, with or without device  Prior Level of Assist for Transfers: Independent  Active : No  Patient's  Info:    5. Taking care of personal grooming such as brushing teeth? [] 1   [] 2    [] 2 [] 3    [x] 3   [] 4      6. Eating meals?   [] 1   [] 2   [] 2   [] 3   [x] 3   [] 4      Raw Score:  14    [24=0% impaired(CH), 23=1-19%(CI), 20-22=20-39%(CJ), 15-19=40-59%(CK), 10-14=60-79%(CL), 7-9=80-99%(CM), 6=100%(CN)]     Mobility:  Supine to sit: Min A (to EOB, Assist required to advance LLE OOB)   Sit to stand: Min A (From EOB to FWW x 2 reps, Assist required to maintain NWB precautions)   Stand to sit: Min A (From FWW to EOB)  Sit to supine: Min A (from EOB, assist required to advance BLE into Bed)        Balance:   Sitting balance: Fair   Standing balance: Fair    Pt educated on role of therapy, POC, safety awareness, importance of OOB activity     Treatment:  Self Care Training:   Cues were given for safety, sequence, UE/LE placement, visual cues, and balance.    Activities performed today included UB/LB dressing tasks, toileting    Safety: Patient left semi fowlers in bed,bed alarm on, call light within reach, RN notified, gait belt used.    Assessment:  Pt is a 50 y.o. female admitted from home for a L ankle fracture resulting in ORIF. Per chart review pt  IND with ADLs and requires assistance with higher level IADLs. Pt presenting this date with limitations in functional activity tolerance, ADLs, functional mobility, safety awareness, problem solving, and sequencing. Pt would benefit from continued acute care OT to increase IND in daily occupations required to return home IND. Upon discharge, pt would benefit from moderate post-acute rehabilitation, anticipate 1-2 hours per day and 5 days per week.     Complexity: Moderate   Prognosis: Good, no significant barriers to participation at this time.     Occupational Therapy Plan  Times Per Week: 3x+  Times Per Day: Once a day  Current Treatment Recommendations: Strengthening, Pain management, Self-Care / ADL, Safety education & training, ROM, Patient/Caregiver education &

## 2025-03-25 NOTE — PROGRESS NOTES
[facility] Nursing Instructor for [facility] RN Nursing Students.   Veronica Tadeo RN

## 2025-03-25 NOTE — CONSULTS
[facility] ACUTE CARE PHYSICAL THERAPY EVALUATION  Bronwyn Cunningham, 1975, 1112/1112-A, 3/25/2025    History  [place]:  The encounter diagnosis was Closed fracture dislocation of left ankle, initial encounter.  Patient  has a past medical history of Cerebellar hypoplasia (HCC), Closed fracture of 4th metacarpal, Closed fracture of left elbow with routine healing, Depression, Developmental delay, Environmental allergies, Finger pain, left- 5th digit MCP, Fracture of lumbar vertebra (HCC), Headache(784.0), HLD (hyperlipidemia), Knee pain, Left knee pain, Unsteady gait, Uses walker, and Weight gain.  Patient  has a past surgical history that includes eye surgery; fracture surgery (2006); and Ankle fracture surgery (Left, 3/24/2025).    Recommendation: Facility for intensive rehabilitation, anticipate 3 hours per day and 5 days per week.    Subjective:  Patient states: \"It's just me and my cat\".    Pain:  reports some pain in LLE at surgical site with mobility, does not provide numerical rating.    Communication with other providers:  RN approval for therapy session, co-eval with OT rPaveena and OT student Bobby  Restrictions: general precautions, falls, NWB LLE    Home Setup/Prior level of function  Social/Functional History  Lives With: Alone  Type of Home: Apartment  Home Layout: One level  Home Access: Level entry  Bathroom Shower/Tub: Walk-in shower  Bathroom Equipment: Built-in shower seat, Grab bars in shower  Home Equipment: Rollator  Has the patient had two or more falls in the past year or any fall with injury in the past year?: Yes  Receives Help From: Personal care attendant  Prior Level of Assist for ADLs: Needs assistance  Prior Level of Assist for Homemaking: Needs assistance  Homemaking Responsibilities: Yes  Prior Level of Assist for Ambulation: Independent household ambulator, with or without device (no AD)  Prior Level of Assist for Transfers: Independent  Active : No  Patient's

## 2025-03-25 NOTE — CARE COORDINATION
03/25/25 1346   Service Assessment   Patient Orientation Alert and Oriented   Cognition Alert   History Provided By Patient;Child/Family   Primary Caregiver Self   Support Systems Parent;Family Members;Friends/Neighbors   Patient's Healthcare Decision Maker is: Legal Next of Kin   PCP Verified by CM Yes   Last Visit to PCP Within last 6 months   Prior Functional Level Assistance with the following:;Shopping   Current Functional Level Independent in ADLs/IADLs   Can patient return to prior living arrangement Yes   Ability to make needs known: Fair   Family able to assist with home care needs: No   Would you like for me to discuss the discharge plan with any other family members/significant others, and if so, who? Yes   Financial Resources Medicare   Community Resources None     Discharge planning initiated. This RN CM spoke with patient and pt's mother via phone about SNF placement. Mother would like referral sent to [facility]. Referral made. CM following for determination.

## 2025-03-25 NOTE — PROGRESS NOTES
Bronwyn Cunningham (1975)    Daily Progress Note-  Glenn Taylor MD               Today's Date:   3/25/2025          Subjective:     Doing well postoperatively.    Pain ratedpain is perceived as moderate (4-6 pain scale)  Denies shortness of breath or chest pain.    Objective:   Patient Vitals for the past 4 hrs:   BP Temp Temp src Pulse Resp SpO2   03/25/25 1015 (!) 199/69 97.9 °F (36.6 °C) Oral 90 14 99 %     I/O last 3 completed shifts:  In: 340 [P.O.:340]  Out: 21 [Urine:1; Blood:20]      Physical Exam:       Left Lower Extremity    Splint :  Clean, dry and intact.       Not too tight and good circulation with brisk cap refill    Lower Extremity Motor :    Wiggles toes, marginal pain to PROM of toes     Lower Extremity Sensory:   Neurovascularly intact to gross sensation and touch in lower extremities.      LABS   CBC:   Recent Labs     03/23/25  1329 03/24/25  0123   WBC 8.0 9.7   HGB 13.7 12.4*    226           Medications   Meds:    sodium chloride flush  5-40 mL IntraVENous 2 times per day    [Held by provider] aspirin  325 mg Oral BID    acetaminophen  650 mg Oral Q6H    polyethylene glycol  17 g Oral Daily    sodium chloride flush  5-40 mL IntraVENous 2 times per day    enoxaparin  40 mg SubCUTAneous Daily    calcium elemental  500 mg Oral Daily    cetirizine  10 mg Oral Nightly    doxycycline hyclate  100 mg Oral Daily    FLUoxetine  40 mg Oral Daily    multivitamin  1 tablet Oral QPM       Assessment and Plan     1.  Post operative day # 1 Left Ankle ORIF (3/24/25)    1:  Mobilize with Physical therapy   -Non-weightbearing Left lower extremity  2:  Will need ORIF medial malleolus is 1-2 weeks  3.  Continue Deep venous thrombosis prophylaxis    -Lovenox in the hospital  3:  Continue Pain Control   -wean to PO meds  4.  Ice and elevate to decrease swelling  5:  D/C Planning:

## 2025-03-26 PROCEDURE — 97530 THERAPEUTIC ACTIVITIES: CPT

## 2025-03-26 PROCEDURE — 6370000000 HC RX 637 (ALT 250 FOR IP): Performed by: ORTHOPAEDIC SURGERY

## 2025-03-26 PROCEDURE — 94150 VITAL CAPACITY TEST: CPT

## 2025-03-26 PROCEDURE — 2700000000 HC OXYGEN THERAPY PER DAY

## 2025-03-26 PROCEDURE — 1200000000 HC SEMI PRIVATE

## 2025-03-26 PROCEDURE — 2500000003 HC RX 250 WO HCPCS: Performed by: ORTHOPAEDIC SURGERY

## 2025-03-26 PROCEDURE — 6360000002 HC RX W HCPCS: Performed by: ORTHOPAEDIC SURGERY

## 2025-03-26 PROCEDURE — 94761 N-INVAS EAR/PLS OXIMETRY MLT: CPT

## 2025-03-26 RX ADMIN — POLYETHYLENE GLYCOL (3350) 17 G: 17 POWDER, FOR SOLUTION ORAL at 09:13

## 2025-03-26 RX ADMIN — CETIRIZINE HYDROCHLORIDE 10 MG: 10 TABLET, FILM COATED ORAL at 22:18

## 2025-03-26 RX ADMIN — ACETAMINOPHEN 650 MG: 325 TABLET ORAL at 06:11

## 2025-03-26 RX ADMIN — ENOXAPARIN SODIUM 40 MG: 100 INJECTION SUBCUTANEOUS at 09:14

## 2025-03-26 RX ADMIN — CALCIUM 500 MG: 500 TABLET ORAL at 09:14

## 2025-03-26 RX ADMIN — POTASSIUM CHLORIDE, DEXTROSE MONOHYDRATE AND SODIUM CHLORIDE: 150; 5; 450 INJECTION, SOLUTION INTRAVENOUS at 03:58

## 2025-03-26 RX ADMIN — ACETAMINOPHEN 650 MG: 325 TABLET ORAL at 00:35

## 2025-03-26 RX ADMIN — ACETAMINOPHEN 650 MG: 325 TABLET ORAL at 22:18

## 2025-03-26 RX ADMIN — FLUOXETINE HYDROCHLORIDE 40 MG: 20 CAPSULE ORAL at 09:14

## 2025-03-26 RX ADMIN — DOXYCYCLINE HYCLATE 100 MG: 100 TABLET, FILM COATED ORAL at 09:14

## 2025-03-26 RX ADMIN — Medication 1 TABLET: at 17:29

## 2025-03-26 RX ADMIN — ACETAMINOPHEN 650 MG: 325 TABLET ORAL at 17:28

## 2025-03-26 ASSESSMENT — PAIN SCALES - GENERAL
PAINLEVEL_OUTOF10: 0
PAINLEVEL_OUTOF10: 2
PAINLEVEL_OUTOF10: 0

## 2025-03-26 ASSESSMENT — PAIN DESCRIPTION - ORIENTATION: ORIENTATION: LEFT

## 2025-03-26 ASSESSMENT — PAIN DESCRIPTION - LOCATION: LOCATION: LEG

## 2025-03-26 ASSESSMENT — PAIN DESCRIPTION - DESCRIPTORS: DESCRIPTORS: ACHING

## 2025-03-26 NOTE — PROGRESS NOTES
V2.0  [facility]      Name:  Bronwyn Cunnnigham /Age/Sex: 1975  (50 y.o. female)   MRN & CSN:  [id number] & [id number] Encounter Date/Time: 3/26/2025 1:54 PM  EDT    Location:  [address] [place] [address] PCP: Awa Figueroa MD       Hospital Day: 4    Assessment and Plan:   Bronwyn Cunningham is a 50 y.o. female with pmh as noted below who presents with Closed left ankle fracture, initial encounter      Plan:  Left ankle fracture  H/O ambulatory dysfunction  Patient presents post fall with pain in left ankle, found on imaging to have trimalleolar fracture  --ED physician reduced the fracture and splinted it  --Follow up on ortho consult, bedrest until weight bearing status is confirmed by ortho  --PT/OT consulted pending weight bearing status  --Analgesics as needed  --3/24 Plan for surgery today with Dr. Taylor.   --3/25 POD#1. Pain controlled. PT/OT when able per ortho, DVT proph. Per ortho     H/O Cerebellar hypoplasia  --Stable  --Continue home regimen    Diet ADULT DIET; Regular  ADULT ORAL NUTRITION SUPPLEMENT; Breakfast, Lunch, HS Snack; Standard High Calorie/High Protein Oral Supplement   DVT Prophylaxis [] Lovenox, []  Heparin, [] SCDs, [] Ambulation,  [] Eliquis, [] Xarelto  [] Coumadin   Code Status Full Code   Disposition From: Home  Expected Disposition: Home  Estimated Date of Discharge: TBD  Patient requires continued admission due to left ankle fracture repair   Surrogate Decision Maker/ POA Jeaneth-mother-POA     Subjective:     Chief Complaint: Closed left ankle fracture, initial encounter     Bronwyn Cunningham is a 50 y.o. female who presents with Closed left ankle fracture, initial encounter  Patient seen and examined at bedside, sitting up in recliner chair.  Patient doing well, denies complaints of chest pain, nausea or vomiting.  Endorses tolerable pain to left lower extremity postoperatively.  Patient to go to work on discharge, pre-CERT pending.    Review of Systems:   1.1 - 2.2    Total Bilirubin <0.2 0.0 - 1.0 mg/dL    Alkaline Phosphatase 72 40 - 129 U/L    ALT 8 (L) 10 - 40 U/L    AST 16 15 - 37 U/L          Imaging/Diagnostics Last 24 Hours   XR ANKLE LEFT (2 VIEWS)  Result Date: 3/23/2025  PROCEDURE: XR ANKLE LEFT (2 VIEWS) DATE OF EXAM:  3/23/2025 14:45 DEMOGRAPHICS: 50 years old Female INDICATION: post reduction COMPARISON: X-ray from earlier in the day. FINDINGS: 2 views of the Left ankle were obtained. Examination in an immobilized in contrast. Trimalleolar fracture dislocation is suggested here. Reduction is incomplete. IMPRESSION:  1.  Post immobilization cast film. This dictation was created with voice recognition software.  While attempts have  been made to review the dictation as it is transcribed, on occasion the spoken word can be misinterpreted by the technology leading to omissions or inappropriate words, phrases or sentences.  Dictated and Electronically Signed By: Satya Wells MD 3/23/2025 15:15        XR ANKLE LEFT (MIN 3 VIEWS)  Result Date: 3/23/2025  PROCEDURE: XR ANKLE LEFT (MIN 3 VIEWS) DATE OF EXAM:  3/23/2025 13:38 DEMOGRAPHICS: 50 years old Female INDICATION: fall, ankle dislocation, ?fracture too COMPARISON: No existing relevant imaging study corresponding to the same anatomical region is available. FINDINGS: 3 views of the Left ankle were obtained. There is a fracture of the medial and lateral malleolus and suspected posterior malleolar fracture as well. There is suggested widening of the medial ankle joint mortise. There is significant artifact from overlying structures. Moderate soft tissue swelling. IMPRESSION:  1.  Fractures of medial and lateral malleoli possibly of the posterior malleolus  as well. Probable mild subluxation as well. This dictation was created with voice recognition software.  While attempts have  been made to review the dictation as it is transcribed, on occasion the spoken word can be misinterpreted by the technology

## 2025-03-26 NOTE — PROGRESS NOTES
03/26/25 1543   Encounter Summary   Encounter Overview/Reason Spiritual/Emotional Needs   Encounter Code  Assessment by  services   Service Provided For Patient   Referral/Consult From Other    Support System Family members   Last Encounter  03/26/25  (Visit by Volunteer  Glenys, charted by  Doug)   Complexity of Encounter Low   Spiritual/Emotional needs   Type Spiritual Support   Assessment/Intervention/Outcome   Assessment Calm   Intervention Active listening;Sustaining Presence/Ministry of presence   Outcome Coping   Plan and Referrals   Plan/Referrals Continue Support (comment)

## 2025-03-26 NOTE — PROGRESS NOTES
Physical Therapy Treatment Note  Name: Bronwyn Cunningham MRN: [id number] :   1975   Date:  3/26/2025   Admission Date: 3/23/2025 Room:  [address] [place] [address]   Restrictions/Precautions:  general precautions, falls, NWB LLE   Communication with other providers:  Pt okay to see for therapy per RN  Subjective:  Patient states:  \"I'm not surprised\"   Pain:   Location, Type, Intensity (0/10 to 10/10):  Denies pn throughout session.   Objective:    Observation:  Pt supine in bed upon PTA arrival.   Objective Measures:  Tele, stable  Treatment, including education/measures:    Therapeutic Activity Training:   Therapeutic activity training was instructed today.  Cues were given for safety, sequence, UE/LE placement, awareness, and balance.    Activities performed today included bed mobility training, sup-sit, sit-stand, SPT.    Mobility:  Sup > sit: SBA  Scooting: SBA  STS: Min A from EOB to RW, Min A from EOB therapist assist.   SPT: Mod A from EOB > recliner.     Increased time for RN to dress IV site dt bleeding, increased time for gown change.     Safety:   Gait belt donned this session. Pt returned safely to recliner with chair alarm activated, call light in reach, all needs met.   Assessment / Impression:    Plan to continue OOB activities.   Patient's tolerance of treatment:  Good   Adverse Reaction: none  Significant change in status and impact:  none  Barriers to improvement:  none  Plan for Next Session:    Plan to continue OOB activities.   Time in:  1144  Time out:  1209  Timed treatment minutes:  25  Total treatment time:  25    Previously filed items:  Social/Functional History  Lives With: Alone  Type of Home: Apartment  Home Layout: One level  Home Access: Level entry  Bathroom Shower/Tub: Walk-in shower  Bathroom Equipment: Built-in shower seat, Grab bars in shower  Home Equipment: Rollator  Has the patient had two or more falls in the past year or any fall with injury in the past year?: Yes  Receives Help From:  Personal care attendant  Prior Level of Assist for ADLs: Needs assistance  Prior Level of Assist for Homemaking: Needs assistance  Homemaking Responsibilities: Yes  Prior Level of Assist for Ambulation: Independent household ambulator, with or without device  Prior Level of Assist for Transfers: Independent  Active : No  Patient's  Info: cargiver  Occupation: On disability        Short Term Goals  Time Frame for Short Term Goals: 2 weeks  Short Term Goal 1: Pt will complete supine <> sit supervision  Short Term Goal 2: Pt will complete sit <> stand supervision while maintaining NWB LLE  Short Term Goal 3: Pt will complete SPT between level surfaces supervision  Short Term Goal 4: Pt will ambulate 10ft with LRAD SBA  Short Term Goal 5: Pt will complete light dynamic standing activity x3  minutes with single UE support, supervision  Additional Goals?: Yes  Short Term Goal 6: Pt will propel manual wheelchair 150ft supervision    Electronically signed by:    Shyla Hollingsworth, DARIO  3/26/2025, 12:27 PM

## 2025-03-27 VITALS
TEMPERATURE: 98.4 F | DIASTOLIC BLOOD PRESSURE: 69 MMHG | OXYGEN SATURATION: 94 % | HEIGHT: 60 IN | SYSTOLIC BLOOD PRESSURE: 123 MMHG | RESPIRATION RATE: 18 BRPM | BODY MASS INDEX: 35.93 KG/M2 | HEART RATE: 90 BPM | WEIGHT: 183 LBS

## 2025-03-27 LAB
ANION GAP SERPL CALCULATED.3IONS-SCNC: 12 MMOL/L (ref 9–17)
BASOPHILS # BLD: 0.05 K/UL
BASOPHILS NFR BLD: 1 % (ref 0–1)
BUN SERPL-MCNC: 11 MG/DL (ref 7–20)
CALCIUM SERPL-MCNC: 8.9 MG/DL (ref 8.3–10.6)
CHLORIDE SERPL-SCNC: 104 MMOL/L (ref 99–110)
CO2 SERPL-SCNC: 22 MMOL/L (ref 21–32)
CREAT SERPL-MCNC: 0.6 MG/DL (ref 0.6–1.1)
EOSINOPHIL # BLD: 0.1 K/UL
EOSINOPHILS RELATIVE PERCENT: 1 % (ref 0–3)
ERYTHROCYTE [DISTWIDTH] IN BLOOD BY AUTOMATED COUNT: 15.5 % (ref 11.7–14.9)
GFR, ESTIMATED: >90 ML/MIN/1.73M2
GLUCOSE SERPL-MCNC: 128 MG/DL (ref 74–99)
HCT VFR BLD AUTO: 40.3 % (ref 37–47)
HGB BLD-MCNC: 12.9 G/DL (ref 12.5–16)
IMM GRANULOCYTES # BLD AUTO: 0.03 K/UL
IMM GRANULOCYTES NFR BLD: 0 %
LYMPHOCYTES NFR BLD: 1.82 K/UL
LYMPHOCYTES RELATIVE PERCENT: 20 % (ref 24–44)
MCH RBC QN AUTO: 27.2 PG (ref 27–31)
MCHC RBC AUTO-ENTMCNC: 32 G/DL (ref 32–36)
MCV RBC AUTO: 85 FL (ref 78–100)
MONOCYTES NFR BLD: 0.52 K/UL
MONOCYTES NFR BLD: 6 % (ref 0–4)
NEUTROPHILS NFR BLD: 72 % (ref 36–66)
NEUTS SEG NFR BLD: 6.54 K/UL
PLATELET # BLD AUTO: 267 K/UL (ref 140–440)
PMV BLD AUTO: 10.7 FL (ref 7.5–11.1)
POTASSIUM SERPL-SCNC: 4 MMOL/L (ref 3.5–5.1)
RBC # BLD AUTO: 4.74 M/UL (ref 4.2–5.4)
SODIUM SERPL-SCNC: 138 MMOL/L (ref 136–145)
WBC OTHER # BLD: 9.1 K/UL (ref 4–10.5)

## 2025-03-27 PROCEDURE — 2500000003 HC RX 250 WO HCPCS: Performed by: ORTHOPAEDIC SURGERY

## 2025-03-27 PROCEDURE — 94150 VITAL CAPACITY TEST: CPT

## 2025-03-27 PROCEDURE — 85025 COMPLETE CBC W/AUTO DIFF WBC: CPT

## 2025-03-27 PROCEDURE — 94761 N-INVAS EAR/PLS OXIMETRY MLT: CPT

## 2025-03-27 PROCEDURE — 94664 DEMO&/EVAL PT USE INHALER: CPT

## 2025-03-27 PROCEDURE — 6370000000 HC RX 637 (ALT 250 FOR IP): Performed by: ORTHOPAEDIC SURGERY

## 2025-03-27 PROCEDURE — 6360000002 HC RX W HCPCS: Performed by: ORTHOPAEDIC SURGERY

## 2025-03-27 PROCEDURE — 97110 THERAPEUTIC EXERCISES: CPT

## 2025-03-27 PROCEDURE — 80048 BASIC METABOLIC PNL TOTAL CA: CPT

## 2025-03-27 PROCEDURE — 36415 COLL VENOUS BLD VENIPUNCTURE: CPT

## 2025-03-27 PROCEDURE — 97530 THERAPEUTIC ACTIVITIES: CPT

## 2025-03-27 RX ORDER — ACETAMINOPHEN 325 MG/1
650 TABLET ORAL EVERY 6 HOURS PRN
Qty: 120 TABLET | Refills: 3
Start: 2025-03-27

## 2025-03-27 RX ORDER — ENOXAPARIN SODIUM 100 MG/ML
30 INJECTION SUBCUTANEOUS DAILY
Qty: 12 ML | Refills: 0
Start: 2025-03-28 | End: 2025-04-27

## 2025-03-27 RX ADMIN — SODIUM CHLORIDE, PRESERVATIVE FREE 10 ML: 5 INJECTION INTRAVENOUS at 09:15

## 2025-03-27 RX ADMIN — ENOXAPARIN SODIUM 40 MG: 100 INJECTION SUBCUTANEOUS at 09:11

## 2025-03-27 RX ADMIN — CALCIUM 500 MG: 500 TABLET ORAL at 09:10

## 2025-03-27 RX ADMIN — POLYETHYLENE GLYCOL (3350) 17 G: 17 POWDER, FOR SOLUTION ORAL at 09:11

## 2025-03-27 RX ADMIN — ACETAMINOPHEN 650 MG: 325 TABLET ORAL at 11:26

## 2025-03-27 RX ADMIN — FLUOXETINE HYDROCHLORIDE 40 MG: 20 CAPSULE ORAL at 09:10

## 2025-03-27 RX ADMIN — ACETAMINOPHEN 650 MG: 325 TABLET ORAL at 06:51

## 2025-03-27 RX ADMIN — POTASSIUM CHLORIDE, DEXTROSE MONOHYDRATE AND SODIUM CHLORIDE: 150; 5; 450 INJECTION, SOLUTION INTRAVENOUS at 04:58

## 2025-03-27 RX ADMIN — DOXYCYCLINE HYCLATE 100 MG: 100 TABLET, FILM COATED ORAL at 09:10

## 2025-03-27 RX ADMIN — POTASSIUM CHLORIDE, DEXTROSE MONOHYDRATE AND SODIUM CHLORIDE: 150; 5; 450 INJECTION, SOLUTION INTRAVENOUS at 01:19

## 2025-03-27 ASSESSMENT — PAIN SCALES - GENERAL: PAINLEVEL_OUTOF10: 0

## 2025-03-27 NOTE — PLAN OF CARE
Problem: Pain  Goal: Verbalizes/displays adequate comfort level or baseline comfort level  3/27/2025 0954 by Anamaria Bolden LPN  Outcome: Progressing       Problem: Skin/Tissue Integrity  Goal: Skin integrity remains intact  Description: 1.  Monitor for areas of redness and/or skin breakdown  2.  Assess vascular access sites hourly  3.  Every 4-6 hours minimum:  Change oxygen saturation probe site  4.  Every 4-6 hours:  If on nasal continuous positive airway pressure, respiratory therapy assess nares and determine need for appliance change or resting period  3/27/2025 0954 by Anamaria Bolden LPN  Outcome: Progressing

## 2025-03-27 NOTE — PROGRESS NOTES
V2.0  [facility] Hospital[place] Progress Note      Name:  Bronwyn Cunningham /Age/Sex: 1975  (50 y.o. female)   MRN & CSN:  [id number] & [id number] Encounter Date/Time: 3/27/2025 7:08 AM EDT    Location:  [address] [place] [address] PCP: Awa Figueroa MD       Hospital Day: 5    Assessment and Plan:   Bronwyn Cunningham is a 50 y.o. female with past medical history of cerebellar hypoplasia, ambulatory dysfunction who presents with Closed left ankle fracture, initial encounter    Left ankle fracture  H/O ambulatory dysfunction  -Patient presents post fall with pain in left ankle, found on imaging to have trimalleolar fracture  -ED physician reduced the fracture and splinted it  - ortho consulted. S/p L ankle ORIF 3/24/25 by Dr. Taylor. Patient needs ORIF to medial malleolus in 1-2 weeks.  -continue pain control, DVT prophylaxis  -PT/OT recommended SNF. CM following, precert pending.     H/O Cerebellar hypoplasia  -Stable  -Continue home regimen    This patient was discussed with Dr. Ruiz. He was agreeable with the assessment and plan as dictated above.     Current Living situation: home  Expected Disposition: SNF  Estimated D/C: 1-2 days    Diet ADULT DIET; Regular  ADULT ORAL NUTRITION SUPPLEMENT; Breakfast, Lunch, HS Snack; Standard High Calorie/High Protein Oral Supplement   DVT Prophylaxis [x] Lovenox, []  Heparin, [] SCDs, [] Ambulation,  [] Eliquis, [] Xarelto []Coumadin   Code Status Full Code   Disposition Patient requires continued admission due to ankle fracture   Surrogate Decision Maker/ DHARMESH Padron      Personally reviewed Lab Studies and Imaging       Subjective:     Chief Complaint: Dislocation (Left ankle)       Patient seen and examined at bedside. Denies pain.      Review of Systems:    Pertinent positives and negatives discussed in HPI    Objective:     Intake/Output Summary (Last 24 hours) at 3/27/2025 0708  Last data filed at 3/27/2025 0326  Gross per 24 hour   Intake 958 ml   Output 2100 ml

## 2025-03-27 NOTE — PROGRESS NOTES
Physical Therapy Treatment Note  Name: Bronwyn Cunningham MRN: [id number] :   1975   Date:  3/27/2025   Admission Date: 3/23/2025 Room:  [address] [place] [address]   Restrictions/Precautions: general precautions, falls, NWB LLE   Communication with other providers:  Pt okay to see for therapy per RN   Subjective:  Patient states:  Agreeable to session.   Pain:   Location, Type, Intensity (0/10 to 10/10):  Denies   Objective:    Observation:  Pt supine in bed upon PTA arrival.   Objective Measures:  Tele, HR resting in 90s, increased to 150bpm in standing, HR decreased to 110s-120s in sitting, increased to 138 with SPT, resting in high 90s.   Treatment, including education/measures:    Therapeutic Activity Training:   Therapeutic activity training was instructed today.  Cues were given for safety, sequence, UE/LE placement, awareness, and balance.    Activities performed today included bed mobility training, sup-sit, sit-stand, SPT.    Mobility:  Sup > sit: SBA with increased effort.   Scooting: SBA  STS: Min-Mod A from EOB and for stabilization in standing dt retro-lean. Pt stood for ~30-40sec before HR increased and pt returned to sitting. Pt demos good adherence to WB restrictions.   SPT: Attempt pivot at R foot with RW but pt is unable to stabilize well enough to perform. Mod A from EOB > recliner, pt demos good adherence to WB restrictions.       Balance:  Seated: Good, pt is able to maintain unsupported sitting balance well with no LOB and light dynamic reaching performed.   Standing: Poor+, pt requires Min-Mod A for standing balance dt retrolean. Significant genu valgum noted in standing.     Exercises:  Pt performed R side APs, SAQ, hip abd/add, heel slides 1 x 15 ea.   Pt performed glut sets 1 x 15 ea.     Safety:   Gait belt donned this session. Pt returned safely to recliner with chair alarm activated, call light in reach, all needs met.   Assessment / Impression:    Pt tolerated OOB activities well this date but does

## 2025-03-27 NOTE — CARE COORDINATION
Transport scheduled for 4:15 with [facility] to go to [facility]. Nurse and facility aware. Packet at nurse's station.     Discharging nurse, complete the following:    **Upon discharge patient will be going to      [facility]     ** Please advise family of discharge.     *Please call report to [phone]  * Fax scripts and AVS to [phone]    **Needs Completed YARIEL

## 2025-03-27 NOTE — PROGRESS NOTES
I am CRISTIAN MARTINI [facility]. I am assigned to this patient.

## 2025-03-27 NOTE — CARE COORDINATION
Bharati initiated and APPROVED via nH portal. nH auth ID [id number] . Approved through 3/31   General: Awake, alert and oriented. No acute distress. Well developed, hydrated and nourished. Appears stated age.   Skin: Skin in warm, dry and intact without rashes or lesions. Appropriate color for ethnicity  HENMT: head normocephalic and atraumatic; bilateral external ears without swelling. no nasal discharge. moist oral mucosa. supple neck, trachea midline  EYES: Conjunctiva clear. nonicteric sclera. EOM intact, Eyelids are normal in appearance without swelling or lesions.  Cardiac: well perfused  Respiratory: breathing comfortably on room air. no audible wheezing or stridor  Abdominal: nondistended, soft, nondistended, mild epigastric ttp  MSK: Neck and back are without deformity, visible external skin changes, or signs of trauma. Curvature of the cervical, thoracic, and lumbar spine are within normal limits. no external signs of trauma. no apparent deficits in ROM of any extremity  Neurological: The patient is awake, alert and oriented to person, place, and time with normal speech. CN 2-12 grossly intact. no apparent deficits. Memory is normal and thought process is intact. No gait abnormalities are appreciated.   Psychiatric: Appropriate mood and affect. Good judgement and insight. No visual or auditory hallucinations.

## 2025-03-27 NOTE — DISCHARGE INSTR - COC
Continuity of Care Form    Patient Name: Bronwyn Cunningham   :  1975  MRN:  [id number]    Admit date:  3/23/2025  Discharge date:  3/27/25    Code Status Order: Full Code   Advance Directives:    Date/Time Healthcare Directive Type of Healthcare Directive Copy in Chart Healthcare Agent Appointed Healthcare Agent's Name Healthcare Agent's Phone Number    [phone] Yes, patient has an advance directive for healthcare treatment  Durable power of  for health care  No, copy requested from family  Patient's parents  Nancy Cunningham  [phone]             Admitting Physician:  Reno Lackey MD  PCP: Awa Figueroa MD    Discharging Nurse: jodi turner lpn  Discharging Hospital Unit/Room#: 1112/1112-A  Discharging Unit Phone Number: [phone]    Emergency Contact:   Extended Emergency Contact Information  Primary Emergency Contact: Nancy Cunningham   [facility]  Home Phone: [phone]  Mobile Phone: [phone]  Relation: Parent    Past Surgical History:  Past Surgical History:   Procedure Laterality Date    ANKLE FRACTURE SURGERY Left 3/24/2025    LEFT ANKLE OPEN REDUCTION INTERNAL FIXATION performed by Glenn [facility] MD at [facility] OR    EYE SURGERY      FRACTURE SURGERY  2006    back, ( back brace ) L4 pedicle at [employer] working out       Immunization History:   Immunization History   Administered Date(s) Administered    COVID-19, MODERNA BLUE border, Primary or Immunocompromised, (age 12y+), IM, 100 mcg/0.5mL 01/10/2022    COVID-19, PFIZER PURPLE top, DILUTE for use, (age 12 y+), 30mcg/0.3mL 2021, 2021    Influenza A ([id number]) Vaccine PF IM 2009    Influenza Virus Vaccine 10/08/2012, 2013, 10/10/2015, 10/07/2019, 10/29/2020    Influenza Whole 2013, 10/10/2015    Influenza, FLUARIX, FLULAVAL, FLUZONE (age 6 mo+) and AFLURIA, (age 3 y+), Quadv PF, 0.5mL 09/15/2016, 2017, 2018, 2021    Influenza, FLUBLOK, (age 18 y+), Quadv PF, 0.5mL

## 2025-03-27 NOTE — DISCHARGE SUMMARY
V2.0  Discharge Summary    Name:  Bronwyn Cunningham /Age/Sex: 1975 (50 y.o. female)   Admit Date: 3/23/2025  Discharge Date: 3/27/25    MRN & CSN:  [id number] & [id number] Encounter Date and Time 3/27/25 2:16 PM EDT    Attending:  Gil Ruiz MD Discharging Provider: Beryl Andrade PA-C       Hospital Course:     Brief HPI: Bronwyn Cunningham is a 50 y.o. female with past medical history of cerebellar hypoplasia, ambulatory dysfunction who presents with Closed left ankle fracture, initial encounter     Problems addressed during this hospitalization:     Left ankle fracture  H/O ambulatory dysfunction  -Patient presents post fall with pain in left ankle, found on imaging to have trimalleolar fracture  -ED physician reduced the fracture and splinted it  - ortho consulted. S/p L ankle ORIF 3/24/25 by Dr. Taylor. Patient needs ORIF to medial malleolus in 1-2 weeks. Has follow-up scheduled 3/31 at 10AM.  - NWB LLE  -continue pain control, DVT prophylaxis (Dr. Taylor recommended lovenox 30mg daily on discharge)  -discharged to SNF     H/O Cerebellar hypoplasia  -Stable  -Continue home regimen    This patient was discussed in conjunction with Dr. Ruiz. He was agreeable with patient's plan at discharge as dictated above.     The patient expressed appropriate understanding of, and agreement with the discharge recommendations, medications, and plan.     Consults this admission:  IP CONSULT TO ORTHOPEDIC SURGERY  IP CONSULT TO ORTHOPEDIC SURGERY    Discharge Diagnosis:   Closed left ankle fracture, initial encounter    Discharge Instruction:   Follow up appointments: PCP  Primary care physician: Awa Figueroa MD within 2 weeks  Diet: regular diet   Activity: activity as tolerated, NWB LLE  Disposition: Discharged to:   []Home, [][facility], [x]SNF, []Acute Rehab, []Hospice   Condition on discharge: Stable  Labs and Tests to be Followed up as an outpatient by PCP or Specialist:     Discharge Medications:

## 2025-07-15 ENCOUNTER — TELEPHONE (OUTPATIENT)
Dept: FAMILY MEDICINE CLINIC | Age: 50
End: 2025-07-15

## 2025-07-15 NOTE — TELEPHONE ENCOUNTER
Cass from [facility] called. Pt is being discharged from [place]. They are asking if PCP will follow for Home Care PT/OT. P# [phone]. Please advise

## 2025-07-16 NOTE — TELEPHONE ENCOUNTER
Yes, I can follow her from home care. She will need a face to face with me as her last visit with me was Dec 2024    Team to please schedule a TCM visit with me Tuesday 7/22 at 10am if her mom can bring her

## 2025-07-18 ENCOUNTER — TELEPHONE (OUTPATIENT)
Dept: FAMILY MEDICINE CLINIC | Age: 50
End: 2025-07-18

## 2025-07-18 NOTE — TELEPHONE ENCOUNTER
Care Transitions Initial Follow Up Call    Outreach made within 2 business days of discharge: Yes    Patient: Bronwyn Cunningham Patient : 1975   MRN: [id number]  Reason for Admission: broken ankle  Discharge Date: 3/27/25       Spoke with: parent    Discharge department/facility: [facility] Interactive Patient Contact:  Was patient able to fill all prescriptions: Yes  Was patient instructed to bring all medications to the follow-up visit: Yes  Is patient taking all medications as directed in the discharge summary? Yes  Does patient understand their discharge instructions: Yes  Does patient have questions or concerns that need addressed prior to 7-14 day follow up office visit: no    Additional needs identified to be addressed with provider  No needs identified             Scheduled appointment with PCP within 7-14 days    Follow Up  Future Appointments   Date Time Provider Department Center   2025  9:15 AM Awa Figueroa MD [facility] DEP   2026 10:00 AM Awa Figueroa MD [facility] DEP       Anjali Alicea

## 2025-07-22 DIAGNOSIS — F33.0 MAJOR DEPRESSIVE DISORDER, RECURRENT EPISODE, MILD: ICD-10-CM

## 2025-07-22 DIAGNOSIS — J30.89 PERENNIAL ALLERGIC RHINITIS WITH SEASONAL VARIATION: ICD-10-CM

## 2025-07-22 DIAGNOSIS — L70.0 ACNE VULGARIS: ICD-10-CM

## 2025-07-22 DIAGNOSIS — J30.2 PERENNIAL ALLERGIC RHINITIS WITH SEASONAL VARIATION: ICD-10-CM

## 2025-07-23 RX ORDER — DOXYCYCLINE HYCLATE 100 MG
100 TABLET ORAL DAILY
Qty: 90 TABLET | Refills: 1 | Status: SHIPPED | OUTPATIENT
Start: 2025-07-23 | End: 2025-08-05 | Stop reason: SDUPTHER

## 2025-07-23 RX ORDER — CETIRIZINE HYDROCHLORIDE 10 MG/1
10 TABLET ORAL NIGHTLY
Qty: 90 TABLET | Refills: 1 | Status: SHIPPED | OUTPATIENT
Start: 2025-07-23 | End: 2025-08-05 | Stop reason: SDUPTHER

## 2025-07-23 RX ORDER — ACETAMINOPHEN 325 MG/1
650 TABLET ORAL EVERY 6 HOURS PRN
Qty: 120 TABLET | Refills: 1 | Status: SHIPPED | OUTPATIENT
Start: 2025-07-23 | End: 2025-08-05 | Stop reason: SDUPTHER

## 2025-07-23 RX ORDER — FLUOXETINE HYDROCHLORIDE 40 MG/1
CAPSULE ORAL
Qty: 90 CAPSULE | Refills: 1 | Status: SHIPPED | OUTPATIENT
Start: 2025-07-23 | End: 2025-08-05 | Stop reason: SDUPTHER

## 2025-07-23 RX ORDER — CALCIUM CARBONATE 500(1250)
500 TABLET ORAL DAILY
Qty: 90 TABLET | Refills: 1 | Status: SHIPPED | OUTPATIENT
Start: 2025-07-23 | End: 2025-08-05 | Stop reason: SDUPTHER

## 2025-07-23 RX ORDER — IBUPROFEN 200 MG
200 TABLET ORAL EVERY 6 HOURS PRN
Qty: 120 TABLET | Refills: 1 | Status: SHIPPED | OUTPATIENT
Start: 2025-07-23 | End: 2025-08-05 | Stop reason: SDUPTHER

## 2025-07-23 RX ORDER — IPRATROPIUM BROMIDE 42 UG/1
1 SPRAY, METERED NASAL 2 TIMES DAILY PRN
Qty: 15 ML | Refills: 1 | Status: SHIPPED | OUTPATIENT
Start: 2025-07-23 | End: 2025-08-05 | Stop reason: ALTCHOICE

## 2025-07-23 RX ORDER — FLUTICASONE PROPIONATE 50 MCG
2 SPRAY, SUSPENSION (ML) NASAL DAILY PRN
Qty: 1 EACH | Refills: 1 | Status: SHIPPED | OUTPATIENT
Start: 2025-07-23 | End: 2025-08-05 | Stop reason: ALTCHOICE

## 2025-08-04 DIAGNOSIS — J30.89 PERENNIAL ALLERGIC RHINITIS WITH SEASONAL VARIATION: ICD-10-CM

## 2025-08-04 DIAGNOSIS — J30.2 PERENNIAL ALLERGIC RHINITIS WITH SEASONAL VARIATION: ICD-10-CM

## 2025-08-04 DIAGNOSIS — L70.0 ACNE VULGARIS: ICD-10-CM

## 2025-08-04 DIAGNOSIS — F33.0 MAJOR DEPRESSIVE DISORDER, RECURRENT EPISODE, MILD: ICD-10-CM

## 2025-08-05 ENCOUNTER — OFFICE VISIT (OUTPATIENT)
Dept: FAMILY MEDICINE CLINIC | Age: 50
End: 2025-08-05
Payer: MEDICARE

## 2025-08-05 VITALS
BODY MASS INDEX: 34.61 KG/M2 | HEART RATE: 96 BPM | DIASTOLIC BLOOD PRESSURE: 70 MMHG | SYSTOLIC BLOOD PRESSURE: 118 MMHG | WEIGHT: 177.2 LBS | OXYGEN SATURATION: 97 %

## 2025-08-05 DIAGNOSIS — J30.2 PERENNIAL ALLERGIC RHINITIS WITH SEASONAL VARIATION: ICD-10-CM

## 2025-08-05 DIAGNOSIS — J30.89 PERENNIAL ALLERGIC RHINITIS WITH SEASONAL VARIATION: ICD-10-CM

## 2025-08-05 DIAGNOSIS — L70.0 ACNE VULGARIS: ICD-10-CM

## 2025-08-05 DIAGNOSIS — R26.81 UNSTEADY GAIT: ICD-10-CM

## 2025-08-05 DIAGNOSIS — F33.0 MAJOR DEPRESSIVE DISORDER, RECURRENT EPISODE, MILD: ICD-10-CM

## 2025-08-05 DIAGNOSIS — R62.50 DEVELOPMENTAL DELAY: ICD-10-CM

## 2025-08-05 DIAGNOSIS — Q04.3 CEREBELLAR HYPOPLASIA (HCC): Primary | ICD-10-CM

## 2025-08-05 PROCEDURE — 99214 OFFICE O/P EST MOD 30 MIN: CPT | Performed by: FAMILY MEDICINE

## 2025-08-05 PROCEDURE — G8427 DOCREV CUR MEDS BY ELIG CLIN: HCPCS | Performed by: FAMILY MEDICINE

## 2025-08-05 PROCEDURE — G8417 CALC BMI ABV UP PARAM F/U: HCPCS | Performed by: FAMILY MEDICINE

## 2025-08-05 PROCEDURE — 3017F COLORECTAL CA SCREEN DOC REV: CPT | Performed by: FAMILY MEDICINE

## 2025-08-05 PROCEDURE — 1036F TOBACCO NON-USER: CPT | Performed by: FAMILY MEDICINE

## 2025-08-05 RX ORDER — DOXYCYCLINE HYCLATE 100 MG
100 TABLET ORAL DAILY
Qty: 90 TABLET | Refills: 3 | Status: SHIPPED | OUTPATIENT
Start: 2025-08-05

## 2025-08-05 RX ORDER — FLUOXETINE HYDROCHLORIDE 40 MG/1
CAPSULE ORAL
Qty: 90 CAPSULE | Refills: 3 | Status: SHIPPED | OUTPATIENT
Start: 2025-08-05

## 2025-08-05 RX ORDER — ACETAMINOPHEN 325 MG/1
650 TABLET ORAL EVERY 6 HOURS PRN
Qty: 120 TABLET | Refills: 3 | Status: SHIPPED | OUTPATIENT
Start: 2025-08-05

## 2025-08-05 RX ORDER — IBUPROFEN 200 MG
200 TABLET ORAL EVERY 6 HOURS PRN
Qty: 120 TABLET | Refills: 3 | Status: SHIPPED | OUTPATIENT
Start: 2025-08-05

## 2025-08-05 RX ORDER — CALCIUM CARBONATE 500(1250)
500 TABLET ORAL DAILY
Qty: 90 TABLET | Refills: 3 | Status: SHIPPED | OUTPATIENT
Start: 2025-08-05

## 2025-08-05 RX ORDER — CETIRIZINE HYDROCHLORIDE 10 MG/1
10 TABLET ORAL NIGHTLY
Qty: 90 TABLET | Refills: 3 | Status: SHIPPED | OUTPATIENT
Start: 2025-08-05

## 2025-08-05 ASSESSMENT — PATIENT HEALTH QUESTIONNAIRE - PHQ9
SUM OF ALL RESPONSES TO PHQ QUESTIONS 1-9: 4
SUM OF ALL RESPONSES TO PHQ QUESTIONS 1-9: 4
3. TROUBLE FALLING OR STAYING ASLEEP: NOT AT ALL
1. LITTLE INTEREST OR PLEASURE IN DOING THINGS: MORE THAN HALF THE DAYS
DEPRESSION UNABLE TO ASSESS: PT REFUSES
9. THOUGHTS THAT YOU WOULD BE BETTER OFF DEAD, OR OF HURTING YOURSELF: NOT AT ALL
4. FEELING TIRED OR HAVING LITTLE ENERGY: NOT AT ALL
6. FEELING BAD ABOUT YOURSELF - OR THAT YOU ARE A FAILURE OR HAVE LET YOURSELF OR YOUR FAMILY DOWN: NOT AT ALL
8. MOVING OR SPEAKING SO SLOWLY THAT OTHER PEOPLE COULD HAVE NOTICED. OR THE OPPOSITE, BEING SO FIGETY OR RESTLESS THAT YOU HAVE BEEN MOVING AROUND A LOT MORE THAN USUAL: NOT AT ALL
2. FEELING DOWN, DEPRESSED OR HOPELESS: MORE THAN HALF THE DAYS
10. IF YOU CHECKED OFF ANY PROBLEMS, HOW DIFFICULT HAVE THESE PROBLEMS MADE IT FOR YOU TO DO YOUR WORK, TAKE CARE OF THINGS AT HOME, OR GET ALONG WITH OTHER PEOPLE: NOT DIFFICULT AT ALL
SUM OF ALL RESPONSES TO PHQ QUESTIONS 1-9: 4
7. TROUBLE CONCENTRATING ON THINGS, SUCH AS READING THE NEWSPAPER OR WATCHING TELEVISION: NOT AT ALL
5. POOR APPETITE OR OVEREATING: NOT AT ALL
SUM OF ALL RESPONSES TO PHQ QUESTIONS 1-9: 4

## 2025-08-06 RX ORDER — FLUTICASONE PROPIONATE 50 MCG
2 SPRAY, SUSPENSION (ML) NASAL DAILY PRN
Qty: 1 EACH | Refills: 1 | OUTPATIENT
Start: 2025-08-06

## 2025-08-06 RX ORDER — IPRATROPIUM BROMIDE 42 UG/1
1 SPRAY, METERED NASAL 2 TIMES DAILY PRN
Qty: 15 ML | Refills: 1 | OUTPATIENT
Start: 2025-08-06

## 2025-08-06 RX ORDER — CALCIUM CARBONATE 500(1250)
500 TABLET ORAL DAILY
Qty: 90 TABLET | Refills: 1 | OUTPATIENT
Start: 2025-08-06

## 2025-08-06 RX ORDER — ACETAMINOPHEN 325 MG/1
650 TABLET ORAL EVERY 6 HOURS PRN
Qty: 120 TABLET | Refills: 1 | OUTPATIENT
Start: 2025-08-06

## 2025-08-06 RX ORDER — DOXYCYCLINE HYCLATE 100 MG
100 TABLET ORAL DAILY
Qty: 90 TABLET | Refills: 1 | OUTPATIENT
Start: 2025-08-06

## 2025-08-06 RX ORDER — CETIRIZINE HYDROCHLORIDE 10 MG/1
10 TABLET ORAL NIGHTLY
Qty: 90 TABLET | Refills: 1 | OUTPATIENT
Start: 2025-08-06

## 2025-08-06 RX ORDER — FLUOXETINE HYDROCHLORIDE 40 MG/1
CAPSULE ORAL
Qty: 90 CAPSULE | Refills: 1 | OUTPATIENT
Start: 2025-08-06

## 2025-08-06 RX ORDER — IBUPROFEN 200 MG
200 TABLET ORAL EVERY 6 HOURS PRN
Qty: 120 TABLET | Refills: 1 | OUTPATIENT
Start: 2025-08-06

## (undated) DEVICE — SYRINGE IRRIG 60ML SFT PLIABLE BLB EZ TO GRP 1 HND USE W/

## (undated) DEVICE — GOWN,SIRUS,POLYRNF,BRTHSLV,XLN/XL,20/CS: Brand: MEDLINE

## (undated) DEVICE — Device

## (undated) DEVICE — INTENDED FOR TISSUE SEPARATION, AND OTHER PROCEDURES THAT REQUIRE A SHARP SURGICAL BLADE TO PUNCTURE OR CUT.: Brand: BARD-PARKER ® STAINLESS STEEL BLADES

## (undated) DEVICE — DRESSING,GAUZE,XEROFORM,CURAD,1"X8",ST: Brand: CURAD

## (undated) DEVICE — COUNTER NDL 30 COUNT FOAM STRP SGL MAG

## (undated) DEVICE — LINER,SEMI-RIGID,3000CC,50EA/CS: Brand: MEDLINE

## (undated) DEVICE — Z DISCONTINUED NO SUB IDED SPLINT ORTH W5XL30IN LAYERED FBRGLS FOAM PD BRTH BK MOLD

## (undated) DEVICE — PADDING CAST W6INXL4YD COT COHESIVE HND TEARABLE SPEC 100

## (undated) DEVICE — TUBING SUCT 12FR MAL ALUM SHFT FN CAP VENT UNIV CONN W/ OBT

## (undated) DEVICE — DRAPE,EXTREMITY,89X128,STERILE: Brand: MEDLINE

## (undated) DEVICE — Z INACTIVE NO ACTIVE SUPPLIER APPLICATOR MEDICATED 26 CC TINT HI-LITE ORNG STRL CHLORAPREP

## (undated) DEVICE — PENCIL ES CRD L10FT HND SWCHING ROCK SWCH W/ EDGE COAT BLDE

## (undated) DEVICE — GOWN,ECLIPSE,POLYRNF,BRTHSLV,L,30/CS: Brand: MEDLINE

## (undated) DEVICE — SYRINGE MED 30ML STD CLR PLAS LUERLOCK TIP N CTRL DISP

## (undated) DEVICE — DRAPE,U/ SHT,SPLIT,PLAS,STERIL: Brand: MEDLINE

## (undated) DEVICE — YANKAUER,FLEXIBLE HANDLE,REGLR CAPACITY: Brand: MEDLINE INDUSTRIES, INC.

## (undated) DEVICE — Z INACTIVE USE 2660663 SOLUTION IRRIG 1000ML STRIL H2O USP PLAS POUR BTL

## (undated) DEVICE — SPONGE GZ W4XL8IN COT WVN 12 PLY

## (undated) DEVICE — BANDAGE,SELF ADHRNT,COFLEX,4"X5YD,STRL: Brand: COLABEL

## (undated) DEVICE — ELECTRODE ES AD CRDLSS PT RET REM POLYHESIVE

## (undated) DEVICE — SUTURE MONOCRYL SZ 3-0 L27IN ABSRB UD L24MM PS-1 3/8 CIR PRIM Y936H

## (undated) DEVICE — Z INACTIVE USE 2863041 SPONGE GZ W4XL4IN 100% COT 16 PLY RADPQ HIGHLY ABSRB

## (undated) DEVICE — TOWEL,OR,DSP,ST,BLUE,STD,6/PK,12PK/CS: Brand: MEDLINE

## (undated) DEVICE — SPONGE LAP W18XL18IN WHT COT 4 PLY FLD STRUNG RADPQ DISP ST 2 PER PACK

## (undated) DEVICE — DRAPE SHEET ULTRAGARD: Brand: MEDLINE

## (undated) DEVICE — SUTURE ETHILON SZ 3-0 L30IN NONABSORBABLE BLK FS-1 L24MM 3/8 669H

## (undated) DEVICE — NEEDLE HYPO 20GA L1.5IN YEL POLYPR HUB S STL REG BVL STR

## (undated) DEVICE — BANDAGE COMPR M W6INXL10YD WHT BGE VELC E MTRX HK AND LOOP

## (undated) DEVICE — SUTURE ABSORBABLE BRAIDED 2-0 CT-1 27 IN UD VICRYL J259H

## (undated) DEVICE — COVER,C-ARM,41X74: Brand: MEDLINE

## (undated) DEVICE — PAD,ABDOMINAL,5"X9",ST,LF,25/BX: Brand: MEDLINE INDUSTRIES, INC.

## (undated) DEVICE — GLOVE SURG SZ 8 L12IN THK75MIL DK GRN LTX FREE

## (undated) DEVICE — BANDAGE,ELASTIC,ESMARK,STERILE,6"X9',LF: Brand: MEDLINE

## (undated) DEVICE — GLOVE ORANGE PI 8   MSG9080

## (undated) DEVICE — PADDING CAST W6INXL4YD COT LO LINTING WYTEX

## (undated) DEVICE — TUBING, SUCTION, 3/16" X 10', STRAIGHT: Brand: MEDLINE